# Patient Record
Sex: FEMALE | Race: WHITE
[De-identification: names, ages, dates, MRNs, and addresses within clinical notes are randomized per-mention and may not be internally consistent; named-entity substitution may affect disease eponyms.]

---

## 2021-06-09 ENCOUNTER — HOSPITAL ENCOUNTER (OUTPATIENT)
Dept: HOSPITAL 47 - RADUSWWP | Age: 76
Discharge: HOME | End: 2021-06-09
Attending: INTERNAL MEDICINE
Payer: MEDICARE

## 2021-06-09 DIAGNOSIS — K76.89: ICD-10-CM

## 2021-06-09 DIAGNOSIS — D64.9: Primary | ICD-10-CM

## 2021-06-09 DIAGNOSIS — R16.1: ICD-10-CM

## 2021-06-09 LAB
BASOPHILS # BLD AUTO: 0 K/UL (ref 0–0.2)
BASOPHILS NFR BLD AUTO: 1 %
EOSINOPHIL # BLD AUTO: 0.1 K/UL (ref 0–0.7)
EOSINOPHIL NFR BLD AUTO: 5 %
ERYTHROCYTE [DISTWIDTH] IN BLOOD BY AUTOMATED COUNT: 3.9 M/UL (ref 3.8–5.4)
ERYTHROCYTE [DISTWIDTH] IN BLOOD: 17 % (ref 11.5–15.5)
HCT VFR BLD AUTO: 29.8 % (ref 34–46)
HGB BLD-MCNC: 8.8 GM/DL (ref 11.4–16)
LDH SPEC-CCNC: 368 U/L (ref 313–618)
LYMPHOCYTES # SPEC AUTO: 1 K/UL (ref 1–4.8)
LYMPHOCYTES NFR SPEC AUTO: 33 %
MCH RBC QN AUTO: 22.6 PG (ref 25–35)
MCHC RBC AUTO-ENTMCNC: 29.6 G/DL (ref 31–37)
MCV RBC AUTO: 76.4 FL (ref 80–100)
MONOCYTES # BLD AUTO: 0.3 K/UL (ref 0–1)
MONOCYTES NFR BLD AUTO: 9 %
NEUTROPHILS # BLD AUTO: 1.5 K/UL (ref 1.3–7.7)
NEUTROPHILS NFR BLD AUTO: 49 %
PLATELET # BLD AUTO: 240 K/UL (ref 150–450)
VIT B12 SERPL-MCNC: 161 PG/ML (ref 200–944)
WBC # BLD AUTO: 3.1 K/UL (ref 3.8–10.6)

## 2021-06-09 PROCEDURE — 85045 AUTOMATED RETICULOCYTE COUNT: CPT

## 2021-06-09 PROCEDURE — 76700 US EXAM ABDOM COMPLETE: CPT

## 2021-06-09 PROCEDURE — 85025 COMPLETE CBC W/AUTO DIFF WBC: CPT

## 2021-06-09 PROCEDURE — 82607 VITAMIN B-12: CPT

## 2021-06-09 PROCEDURE — 82746 ASSAY OF FOLIC ACID SERUM: CPT

## 2021-06-09 PROCEDURE — 84165 PROTEIN E-PHORESIS SERUM: CPT

## 2021-06-09 PROCEDURE — 83615 LACTATE (LD) (LDH) ENZYME: CPT

## 2021-06-09 NOTE — US
EXAMINATION TYPE: US abdomen complete

 

DATE OF EXAM: 6/9/2021

 

COMPARISON: NONE

 

CLINICAL HISTORY: D64.9 Anemia.

 

EXAM MEASUREMENTS:

 

Liver Length:  16.1 cm   

Gallbladder Wall:  0.2 cm   

CBD:  0.5 cm

Spleen:  14.0 cm   

Right Kidney:  10.9 x 4.9 x 5.2 cm 

Left Kidney:  11.3 x 5.8 x 4.8 cm   

 

Morbidly obese patient who was unable to follow direction. Technically difficult very limited study. 


 

Pancreas:  Obscured by bowel gas

Liver:  heterogeneous, limited views  

Gallbladder:  cholelithiasis

**Evidence for sonographic Wilks's sign:  no

CBD:  wnl 

Spleen:  enlarged   

Right Kidney:  cortical thinning, limited views   

Left Kidney:  not well visualized, ? hydronephrosis

Upper IVC:  wnl  

Abd Aorta:  limited views

 

 

 

Suboptimal study due to patient's large body habitus. Visualized portion of the proximal and mid aort
a shows no aneurysm. Visualized portion of pancreas likely heterogeneous without mass or ductal dilat
ation. IVC seen hepatic dome.

 

Heterogeneity and visualized portion of liver without obvious mass or ductal dilatation. Intraluminal
 mobile shadowing gallstones. No abnormal wall thickening or pericholecystic fluid. Cortical thinning
 right kidney without hydronephrosis. Mild splenomegaly of 14.0 cm long axis. Poorly visualized left 
kidney on images saved. Cannot exclude left-sided hydronephrosis.

 

IMPRESSION: Mild splenomegaly with heterogeneous hyperechoic appearance of liver could reflect diffus
e fatty infiltration and/or underlying hepatocellular disease. No surrounding ascites. Suboptimal freddie
dy.

## 2021-06-10 LAB
ALBUMIN SERPL ELPH-MCNC: 3.62 G/DL (ref 3.8–4.9)
GAMMA GLOB SERPL ELPH-MCNC: 1.31 G/DL (ref 0.7–1.5)

## 2021-08-24 ENCOUNTER — HOSPITAL ENCOUNTER (OUTPATIENT)
Dept: HOSPITAL 47 - ORWHC2ENDO | Age: 76
Discharge: HOME | End: 2021-08-24
Attending: SURGERY
Payer: MEDICARE

## 2021-08-24 VITALS — DIASTOLIC BLOOD PRESSURE: 77 MMHG | SYSTOLIC BLOOD PRESSURE: 141 MMHG | HEART RATE: 75 BPM

## 2021-08-24 VITALS — BODY MASS INDEX: 43.2 KG/M2

## 2021-08-24 VITALS — TEMPERATURE: 97.9 F | RESPIRATION RATE: 16 BRPM

## 2021-08-24 DIAGNOSIS — Z88.0: ICD-10-CM

## 2021-08-24 DIAGNOSIS — Z86.010: ICD-10-CM

## 2021-08-24 DIAGNOSIS — D12.2: ICD-10-CM

## 2021-08-24 DIAGNOSIS — G40.909: ICD-10-CM

## 2021-08-24 DIAGNOSIS — D12.3: ICD-10-CM

## 2021-08-24 DIAGNOSIS — Z12.11: Primary | ICD-10-CM

## 2021-08-24 DIAGNOSIS — E03.9: ICD-10-CM

## 2021-08-24 DIAGNOSIS — D50.9: ICD-10-CM

## 2021-08-24 DIAGNOSIS — D13.1: ICD-10-CM

## 2021-08-24 DIAGNOSIS — Z79.890: ICD-10-CM

## 2021-08-24 DIAGNOSIS — F82: ICD-10-CM

## 2021-08-24 DIAGNOSIS — D51.9: ICD-10-CM

## 2021-08-24 DIAGNOSIS — D12.4: ICD-10-CM

## 2021-08-24 DIAGNOSIS — Z79.899: ICD-10-CM

## 2021-08-24 DIAGNOSIS — K31.7: ICD-10-CM

## 2021-08-24 DIAGNOSIS — K31.9: ICD-10-CM

## 2021-08-24 PROCEDURE — 43239 EGD BIOPSY SINGLE/MULTIPLE: CPT

## 2021-08-24 PROCEDURE — 45385 COLONOSCOPY W/LESION REMOVAL: CPT

## 2021-08-24 PROCEDURE — 88305 TISSUE EXAM BY PATHOLOGIST: CPT

## 2021-08-24 NOTE — P.PCN
Date of Procedure: 08/24/21


Procedure(s) Performed: 


PREOPERATIVE DIAGNOSIS: Anemia, screening


POSTOPERATIVE DIAGNOSIS: Gastric mass, gastric polyp, gastric nodules, multiple 

colon polyps, diverticulosis


PROCEDURE: 1.  EGD with biopsy 2.  Colonoscopy with snare polypectomy


ANESTHESIA: MAC


SURGEON: Tomas Valiente M.D.


SPECIMENS: Gastric mass, gastric polyp, gastric nodule, colon polyps


ENDOSCOPIC PROCEDURE: The patient was on the endoscopy table in the left 

decubitus position.  The Olympus gastroscope was inserted into the oropharynx 

and passed under direct visualization to the region of the third portion of the 

duodenum.  From that point the scope was slowly withdrawn inspecting all 

surfaces carefully.  There were no neoplastic inflammatory or polypoid lesions 

throughout the duodenum.  The pylorus was widely patent.  The stomach was 

carefully inspected.  There was a mass present along the lesser curvature that 

was friable in nature.  This was somewhat pedunculated in appearance.  This 

measured approximately 4-5 cm in diameter.  Multiple biopsies were taken of this

area.  This was present about 8-10 cm distal to the GE junction.  In the antrum 

there was some nodularity of the mucosa that was biopsied.  In the proximal 

antrum along the greater curvature a 1 cm polypoid lesion was seen and biopsied 

as well.  Retroflexion revealed normal hiatus.  The esophagus was examined and 

appeared normal.  


     The patient was kept on the endoscopy table in the left decubitus position.

 The Olympus colonoscope was inserted into the anus and passed under direct 

visualization to the base of the cecum.  The appendiceal orifice was visualized.

 From that point the scope was slowly withdrawn inspecting all surfaces 

carefully.  There were no neoplastic inflammatory or polypoid lesions throughout

the cecum.  In the ascending colon a polyp was seen and removed using the snare 

with cautery technique.  In the transverse colon another small polyp was seen 

and removed in a similar fashion.  In the descending colon another small polyp 

was seen and removed in a similar fashion.  The remainder of the descending 

sigmoid and rectum appeared normal.  The patient had scattered diverticulosis.  

Digital rectal examination was normal.  The patient was taken to the recovery 

room in stable condition per anesthesia guidelines.


RECOMMENDATIONS: Await biopsy results.  Source of anemia appears to be related 

to the gastric mass seen.  Follow-up in office 1-2 weeks. Total Volume (Ccs): 1.5 Kenalog Preparation: kenalog Concentration (Mg/Ml): 40.0 Administered By (Optional): Veronica Samaniego CMA Detail Level: None Consent: The risks of atrophy were reviewed with the patient. Add Option For Additional Mediation: No Concentration (Mg/Ml) Of Additional Medication: 2.5

## 2021-08-24 NOTE — P.GSHP
History of Present Illness


H&P Date: 08/24/21


Chief Complaint: Anemia, screening





75-year-old female here for screening colonoscopy and workup for iron deficiency

anemia.  Patient with personal history of colon polyps during colonoscopy 2012. 

No bowel complaints.  No rectal bleeding or melena.





Past Medical History


Past Medical History: Seizure Disorder


Additional Past Medical History / Comment(s): DEVELPMENTAL DELAY. , LIVES ALONE 

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 

GROCERIES (NEMESIO HAYNES # [phone]), PT STATES SEIZURES SINCE 8 YEARS OLD, 

STATES HER LAST SEIZURE WAS 1960., STATES OCCASIONAL SWELLING IN ANKLES ., 

ANEMIA & LOW B12 (DR CORINNA SEXTON)., STATES MOLES REMOVED FROM HER BACK TODAY 

(8/20/21).,  USING WALKER.


History of Any Multi-Drug Resistant Organisms: None Reported


Past Surgical History: Tonsillectomy


Additional Past Surgical History / Comment(s): CYST ON TAILBONE, CYST ON BREAST 

REMOVED.


Past Anesthesia/Blood Transfusion Reactions: No Reported Reaction


Additional Past Anesthesia/Blood Transfusion Reaction / Comment(s): STATES SHE 

GETS "TOO NERVOUS"


Additional Psychological History / Comment(s): DEVELOPMENTAL DELAY


Smoking Status: Never smoker


Past Alcohol Use History: None Reported


Past Drug Use History: None Reported





- Past Family History


  ** Mother


Family Medical History: Unable to Obtain





Medications and Allergies


                                Home Medications











 Medication  Instructions  Recorded  Confirmed  Type


 


Bumetanide 2 mg PO BID 08/20/21 08/20/21 History


 


Cholecalciferol [Vitamin D3 (25 50 mcg PO DAILY 08/20/21 08/20/21 History





Mcg = 1000 Iu)]    


 


Levothyroxine Sodium 125 mcg PO DAILY 08/20/21 08/20/21 History


 


Multivitamin [Multivitamins Adult 1 each PO DAILY 08/20/21 08/20/21 History





Gummies]    


 


Phenytoin Sodium Extended 200 mg PO BID 08/20/21 08/20/21 History





[Dilantin]    


 


Primidone 250 mg PO HS 08/20/21 08/20/21 History


 


Potassium Chloride 10 meq PO BID 08/23/21 08/23/21 History








                                    Allergies











Allergy/AdvReac Type Severity Reaction Status Date / Time


 


Penicillins Allergy  Rash/Hives Verified 08/24/21 10:26














Surgical - Exam


                                   Vital Signs











Temp Pulse Resp BP Pulse Ox


 


 97.9 F   93   16   139/63   93 L


 


 08/24/21 10:30  08/24/21 10:30  08/24/21 10:30  08/24/21 10:30  08/24/21 10:30

















Physical exam:


General: Well-developed, well-nourished


HEENT: Normocephalic, sclerae nonicteric


Abdomen: Nontender, nondistended


Extremities: No edema


Neuro: Alert and oriented





Assessment and Plan


(1) Colon cancer screening


Narrative/Plan: 


Will proceed with upper and lower endoscopy.


Current Visit: Yes   Status: Acute   Code(s): Z12.11 - ENCOUNTER FOR SCREENING 

FOR MALIGNANT NEOPLASM OF COLON   SNOMED Code(s): 530794745

## 2022-10-06 ENCOUNTER — HOSPITAL ENCOUNTER (OUTPATIENT)
Dept: HOSPITAL 47 - RADUSWWP | Age: 77
Discharge: HOME | End: 2022-10-06
Attending: INTERNAL MEDICINE
Payer: MEDICARE

## 2022-10-06 DIAGNOSIS — R60.0: Primary | ICD-10-CM

## 2022-10-06 LAB
ALBUMIN SERPL-MCNC: 3.9 G/DL (ref 3.8–4.9)
ALBUMIN/GLOB SERPL: 1.24 G/DL (ref 1.6–3.17)
ALP SERPL-CCNC: 125 U/L (ref 41–126)
ALT SERPL-CCNC: 30 U/L (ref 8–44)
ANION GAP SERPL CALC-SCNC: 8.7 MMOL/L (ref 10–18)
AST SERPL-CCNC: 21 U/L (ref 13–35)
BASOPHILS # BLD AUTO: 0.03 X 10*3/UL (ref 0–0.1)
BASOPHILS NFR BLD AUTO: 0.7 %
BUN SERPL-SCNC: 17.8 MG/DL (ref 9–27)
BUN/CREAT SERPL: 26.85 RATIO (ref 12–20)
CALCIUM SPEC-MCNC: 10.6 MG/DL (ref 8.7–10.3)
CHLORIDE SERPL-SCNC: 106 MMOL/L (ref 96–109)
CHOLEST SERPL-MCNC: 147 MG/DL (ref 0–200)
CO2 SERPL-SCNC: 26.1 MMOL/L (ref 20–27.5)
EOSINOPHIL # BLD AUTO: 0.23 X 10*3/UL (ref 0.04–0.35)
EOSINOPHIL NFR BLD AUTO: 5.3 %
ERYTHROCYTE [DISTWIDTH] IN BLOOD BY AUTOMATED COUNT: 4.12 X 10*6/UL (ref 4.1–5.2)
ERYTHROCYTE [DISTWIDTH] IN BLOOD: 13.8 % (ref 11.5–14.5)
FERRITIN SERPL-MCNC: 57.5 NG/ML (ref 10–291)
GLOBULIN SER CALC-MCNC: 3.2 G/DL (ref 1.6–3.3)
GLUCOSE SERPL-MCNC: 126 MG/DL (ref 70–110)
HCT VFR BLD AUTO: 42.3 % (ref 37.2–46.3)
HDLC SERPL-MCNC: 65.9 MG/DL (ref 40–60)
HGB BLD-MCNC: 13.6 G/DL (ref 12–15)
IMM GRANULOCYTES BLD QL AUTO: 0 %
IRON SERPL-MCNC: 72 UG/DL (ref 50–170)
LDLC SERPL CALC-MCNC: 68.4 MG/DL (ref 0–131)
LYMPHOCYTES # SPEC AUTO: 1.44 X 10*3/UL (ref 0.9–5)
LYMPHOCYTES NFR SPEC AUTO: 33.3 %
MCH RBC QN AUTO: 33 PG (ref 27–32)
MCHC RBC AUTO-ENTMCNC: 32.2 G/DL (ref 32–37)
MCV RBC AUTO: 102.7 FL (ref 80–97)
MONOCYTES # BLD AUTO: 0.44 X 10*3/UL (ref 0.2–1)
MONOCYTES NFR BLD AUTO: 10.2 %
NEUTROPHILS # BLD AUTO: 2.18 X 10*3/UL (ref 1.8–7.7)
NEUTROPHILS NFR BLD AUTO: 50.5 %
NRBC BLD AUTO-RTO: 0 /100 WBCS (ref 0–0)
PHENYTOIN SERPL-MCNC: 8 UG/ML (ref 10–20)
PLATELET # BLD AUTO: 156 X 10*3/UL (ref 140–440)
POTASSIUM SERPL-SCNC: 4.9 MMOL/L (ref 3.5–5.5)
PROT SERPL-MCNC: 7.1 G/DL (ref 6.2–8.2)
SODIUM SERPL-SCNC: 140 MMOL/L (ref 135–145)
T4 FREE SERPL-MCNC: 0.96 NG/DL (ref 0.8–1.8)
TIBC SERPL-MCNC: 301 UG/DL (ref 228–460)
TRIGL SERPL-MCNC: 63.4 MG/DL (ref 0–149)
VIT B12 SERPL-MCNC: 296 PG/ML (ref 200–944)
VLDLC SERPL CALC-MCNC: 12.68 MG/DL (ref 5–40)
WBC # BLD AUTO: 4.32 X 10*3/UL (ref 4.5–10)

## 2022-10-06 PROCEDURE — 83550 IRON BINDING TEST: CPT

## 2022-10-06 PROCEDURE — 80185 ASSAY OF PHENYTOIN TOTAL: CPT

## 2022-10-06 PROCEDURE — 82746 ASSAY OF FOLIC ACID SERUM: CPT

## 2022-10-06 PROCEDURE — 83036 HEMOGLOBIN GLYCOSYLATED A1C: CPT

## 2022-10-06 PROCEDURE — 82306 VITAMIN D 25 HYDROXY: CPT

## 2022-10-06 PROCEDURE — 83540 ASSAY OF IRON: CPT

## 2022-10-06 PROCEDURE — 85025 COMPLETE CBC W/AUTO DIFF WBC: CPT

## 2022-10-06 PROCEDURE — 80053 COMPREHEN METABOLIC PANEL: CPT

## 2022-10-06 PROCEDURE — 80061 LIPID PANEL: CPT

## 2022-10-06 PROCEDURE — 82607 VITAMIN B-12: CPT

## 2022-10-06 PROCEDURE — 93922 UPR/L XTREMITY ART 2 LEVELS: CPT

## 2022-10-06 PROCEDURE — 84443 ASSAY THYROID STIM HORMONE: CPT

## 2022-10-06 PROCEDURE — 93306 TTE W/DOPPLER COMPLETE: CPT

## 2022-10-06 PROCEDURE — 84439 ASSAY OF FREE THYROXINE: CPT

## 2022-10-06 PROCEDURE — 82728 ASSAY OF FERRITIN: CPT

## 2022-10-07 NOTE — CA
Transthoracic Echo Report 

 Name: Ilana Schulte 

 MRN:    [id number] 

 Age:    77     Gender:     F 

 

 :    1945 

 Exam Date:     10/06/2022 11:41 

 Exam Location: [facility] 

 Ht (in):     65     Wt (lb):     240 

 Ordering Physician:        Justino Allen MD 

 Attending/Referring Phys: 

 Technician         Gill Tadeo RDCS 

 Procedure CPT: 

 Indications:       R60.0 localized swelling 

 

 Cardiac Hx: 

 Technical Quality:      Technically difficult study 

 Contrast 1:    Lumason                     Total Dose (mL):      4 

 Contrast 2:                                Total Dose (mL): 

 

 MEASUREMENTS  (Male / Female) Normal Values 

 2D ECHO 

 LV Diastolic Diameter PLAX        4.6 cm                4.2 - 5.9 / 3.9 - 5.3 cm 

 LV Systolic Diameter PLAX         3.1 cm                 

 IVS Diastolic Thickness           1.3 cm                0.6 - 1.0 / 0.6 - 0.9 cm 

 LVPW Diastolic Thickness          1.2 cm                0.6 - 1.0 / 0.6 - 0.9 cm 

 LV Relative Wall Thickness        0.5                    

 RV Internal Dim ED PLAX           3.7 cm                 

 

 M-MODE 

 Aortic Root Diameter MM           2.9 cm                 

 LA Systolic Diameter MM           3.6 cm                 

 LA Ao Ratio MM                    1.2                    

 AV Cusp Separation MM             1.7 cm                 

 

 DOPPLER 

 AV Peak Velocity                  76.2 cm/s              

 AV Peak Gradient                  2.3 mmHg               

 LVOT Peak Velocity                87.9 cm/s              

 LVOT Peak Gradient                3.1 mmHg               

 MV Area PHT                       3.9 cm???                

 Mitral E Point Velocity           69.4 cm/s              

 Mitral A Point Velocity           73.3 cm/s              

 Mitral E to A Ratio               0.9                    

 MV Deceleration Time              192.5 ms               

 TR Peak Velocity                  222.4 cm/s             

 TR Peak Gradient                  19.8 mmHg              

 Right Ventricular Systolic Press  24.8 mmHg              

 

 

 FINDINGS 

 Left Ventricle 

 Mildly increased left ventricular wall thickness. Normal left ventricular  

 systolic function with no obvious regional wall motion abnormalities. Left  

 ventricular ejection fraction is estimated at 50-55 %. 

 

 Right Ventricle 

 Mild right ventricular dilatation. Right ventricular systolic pressure within  

 normal limits. 

 

 Right Atrium 

 Normal right atrial size. 

 

 Left Atrium 

 Normal left atrial size. 

 

 Mitral Valve 

 Mitral valve not well visualized. No mitral stenosis. 

 

 Aortic Valve 

 No aortic valve stenosis or regurgitation. 

 

 Tricuspid Valve 

 Mild tricuspid regurgitation. 

 

 Pulmonic Valve 

 Trace pulmonic regurgitation. 

 

 Pericardium 

 No pericardial effusion. 

 

 Aorta 

 Normal size aortic root and proximal ascending aorta. 

 

 CONCLUSIONS 

 Technically difficult study for interpretation 

 Normal left ventricular dimension and systolic function 

 Previewed by:  

 Dr. Karri Hurley MD 

 (Electronically Signed) 

 Final Date:      2022 10:44

## 2022-10-10 NOTE — US
EXAMINATION TYPE: US arterial LE single level

 

DATE OF EXAM: 10/6/2022 11:29 AM

 

CLINICAL HISTORY: R60.0 LOCALIZED EDEMA. painful heels where patient has blistering, swelling

 

Doppler Waveforms: 

Right: Biphasic

Left: Biphasic

 

 

Ankle-Brachial Indices:

Right: 1.0

Left: 1.0

 

Toe Brachial Indices:

Right: 0.6

Left: could not obtain, big toe crosses over 2nd digit and has red wound that is painful for patient.
 

 

 

 

IMPRESSION:  Some limitations the exam. Normal ankle-brachial indices. 7 Hour(s) 9 Minute(s)

## 2023-08-06 ENCOUNTER — HOSPITAL ENCOUNTER (INPATIENT)
Dept: HOSPITAL 47 - EC | Age: 78
LOS: 9 days | Discharge: NURSING, REHAB OR LONG-TERM CARE | DRG: 641 | End: 2023-08-15
Attending: INTERNAL MEDICINE | Admitting: INTERNAL MEDICINE
Payer: MEDICARE

## 2023-08-06 VITALS — BODY MASS INDEX: 46.9 KG/M2

## 2023-08-06 DIAGNOSIS — Z71.3: ICD-10-CM

## 2023-08-06 DIAGNOSIS — E87.70: ICD-10-CM

## 2023-08-06 DIAGNOSIS — L30.9: ICD-10-CM

## 2023-08-06 DIAGNOSIS — E66.01: ICD-10-CM

## 2023-08-06 DIAGNOSIS — E87.5: ICD-10-CM

## 2023-08-06 DIAGNOSIS — E86.1: ICD-10-CM

## 2023-08-06 DIAGNOSIS — Z86.018: ICD-10-CM

## 2023-08-06 DIAGNOSIS — T36.8X5A: ICD-10-CM

## 2023-08-06 DIAGNOSIS — E87.1: Primary | ICD-10-CM

## 2023-08-06 DIAGNOSIS — E03.9: ICD-10-CM

## 2023-08-06 DIAGNOSIS — Z79.899: ICD-10-CM

## 2023-08-06 DIAGNOSIS — R59.0: ICD-10-CM

## 2023-08-06 DIAGNOSIS — G40.909: ICD-10-CM

## 2023-08-06 DIAGNOSIS — Z88.0: ICD-10-CM

## 2023-08-06 DIAGNOSIS — R62.50: ICD-10-CM

## 2023-08-06 DIAGNOSIS — Z79.890: ICD-10-CM

## 2023-08-06 LAB
ALBUMIN SERPL-MCNC: 2.7 G/DL (ref 3.5–5)
ALP SERPL-CCNC: 81 U/L (ref 38–126)
ALT SERPL-CCNC: 49 U/L (ref 4–34)
ANION GAP SERPL CALC-SCNC: 5 MMOL/L
APTT BLD: 25.1 SEC (ref 22–30)
AST SERPL-CCNC: 140 U/L (ref 14–36)
BASOPHILS # BLD AUTO: 0 K/UL (ref 0–0.2)
BASOPHILS NFR BLD AUTO: 0 %
BUN SERPL-SCNC: 19 MG/DL (ref 7–17)
CALCIUM SPEC-MCNC: 8.7 MG/DL (ref 8.4–10.2)
CHLORIDE SERPL-SCNC: 86 MMOL/L (ref 98–107)
CO2 SERPL-SCNC: 19 MMOL/L (ref 22–30)
EOSINOPHIL # BLD AUTO: 0.1 K/UL (ref 0–0.7)
EOSINOPHIL NFR BLD AUTO: 2 %
ERYTHROCYTE [DISTWIDTH] IN BLOOD BY AUTOMATED COUNT: 3.59 M/UL (ref 3.8–5.4)
ERYTHROCYTE [DISTWIDTH] IN BLOOD: 14.3 % (ref 11.5–15.5)
GLUCOSE BLD-MCNC: 147 MG/DL (ref 70–110)
GLUCOSE SERPL-MCNC: 156 MG/DL (ref 74–99)
HCT VFR BLD AUTO: 32.8 % (ref 34–46)
HGB BLD-MCNC: 11.7 GM/DL (ref 11.4–16)
INR PPP: 0.9 (ref ?–1.2)
KETONES UR QL STRIP.AUTO: (no result)
LYMPHOCYTES # SPEC AUTO: 0.5 K/UL (ref 1–4.8)
LYMPHOCYTES NFR SPEC AUTO: 6 %
MAGNESIUM SPEC-SCNC: 1.8 MG/DL (ref 1.6–2.3)
MCH RBC QN AUTO: 32.6 PG (ref 25–35)
MCHC RBC AUTO-ENTMCNC: 35.7 G/DL (ref 31–37)
MCV RBC AUTO: 91.3 FL (ref 80–100)
MONOCYTES # BLD AUTO: 0.6 K/UL (ref 0–1)
MONOCYTES NFR BLD AUTO: 8 %
NEUTROPHILS # BLD AUTO: 5.9 K/UL (ref 1.3–7.7)
NEUTROPHILS NFR BLD AUTO: 82 %
NT-PROBNP SERPL-MCNC: 48 PG/ML
PH UR: 5.5 [PH] (ref 5–8)
PLATELET # BLD AUTO: 169 K/UL (ref 150–450)
POTASSIUM SERPL-SCNC: 4.7 MMOL/L (ref 3.5–5.1)
PROT SERPL-MCNC: 5.9 G/DL (ref 6.3–8.2)
PT BLD: 9.6 SEC (ref 9–12)
RBC UR QL: <1 /HPF (ref 0–5)
SODIUM SERPL-SCNC: 110 MMOL/L (ref 137–145)
SP GR UR: 1.02 (ref 1–1.03)
SQUAMOUS UR QL AUTO: <1 /HPF (ref 0–4)
UROBILINOGEN UR QL STRIP: <2 MG/DL (ref ?–2)
WBC # BLD AUTO: 7.2 K/UL (ref 3.8–10.6)
WBC #/AREA URNS HPF: 1 /HPF (ref 0–5)

## 2023-08-06 PROCEDURE — 70450 CT HEAD/BRAIN W/O DYE: CPT

## 2023-08-06 PROCEDURE — 82533 TOTAL CORTISOL: CPT

## 2023-08-06 PROCEDURE — 94760 N-INVAS EAR/PLS OXIMETRY 1: CPT

## 2023-08-06 PROCEDURE — 93306 TTE W/DOPPLER COMPLETE: CPT

## 2023-08-06 PROCEDURE — 76937 US GUIDE VASCULAR ACCESS: CPT

## 2023-08-06 PROCEDURE — 36410 VNPNXR 3YR/> PHY/QHP DX/THER: CPT

## 2023-08-06 PROCEDURE — 80048 BASIC METABOLIC PNL TOTAL CA: CPT

## 2023-08-06 PROCEDURE — 74177 CT ABD & PELVIS W/CONTRAST: CPT

## 2023-08-06 PROCEDURE — 84100 ASSAY OF PHOSPHORUS: CPT

## 2023-08-06 PROCEDURE — 36415 COLL VENOUS BLD VENIPUNCTURE: CPT

## 2023-08-06 PROCEDURE — 84484 ASSAY OF TROPONIN QUANT: CPT

## 2023-08-06 PROCEDURE — 85025 COMPLETE CBC W/AUTO DIFF WBC: CPT

## 2023-08-06 PROCEDURE — 83880 ASSAY OF NATRIURETIC PEPTIDE: CPT

## 2023-08-06 PROCEDURE — 84443 ASSAY THYROID STIM HORMONE: CPT

## 2023-08-06 PROCEDURE — 85730 THROMBOPLASTIN TIME PARTIAL: CPT

## 2023-08-06 PROCEDURE — 99291 CRITICAL CARE FIRST HOUR: CPT

## 2023-08-06 PROCEDURE — 96361 HYDRATE IV INFUSION ADD-ON: CPT

## 2023-08-06 PROCEDURE — 96374 THER/PROPH/DIAG INJ IV PUSH: CPT

## 2023-08-06 PROCEDURE — 80186 ASSAY OF PHENYTOIN FREE: CPT

## 2023-08-06 PROCEDURE — 96375 TX/PRO/DX INJ NEW DRUG ADDON: CPT

## 2023-08-06 PROCEDURE — 83735 ASSAY OF MAGNESIUM: CPT

## 2023-08-06 PROCEDURE — 95816 EEG AWAKE AND DROWSY: CPT

## 2023-08-06 PROCEDURE — 80053 COMPREHEN METABOLIC PANEL: CPT

## 2023-08-06 PROCEDURE — 83935 ASSAY OF URINE OSMOLALITY: CPT

## 2023-08-06 PROCEDURE — 85610 PROTHROMBIN TIME: CPT

## 2023-08-06 PROCEDURE — 81001 URINALYSIS AUTO W/SCOPE: CPT

## 2023-08-06 PROCEDURE — 83930 ASSAY OF BLOOD OSMOLALITY: CPT

## 2023-08-06 PROCEDURE — 71045 X-RAY EXAM CHEST 1 VIEW: CPT

## 2023-08-06 PROCEDURE — 84300 ASSAY OF URINE SODIUM: CPT

## 2023-08-06 PROCEDURE — 93005 ELECTROCARDIOGRAM TRACING: CPT

## 2023-08-06 PROCEDURE — 84295 ASSAY OF SERUM SODIUM: CPT

## 2023-08-06 NOTE — ED
Syncope HPI





- General


Chief Complaint: Fall


Stated Complaint: fall


Time Seen by Provider: 08/06/23 16:26


Source: EMS, RN notes reviewed, old records reviewed, Caregiver


Mode of arrival: EMS


Limitations: no limitations





- History of Present Illness


Initial Comments: 





This is a 77-year-old female to the emergency department for evaluation weakness

weakness continuing to feel well significantly swollen and possible syncopal 

event.  Patient has persistent symptoms since syncopal event and she fell off 

the toilet.  Patient has weakness currently were unable to provide accurate 

history historian at baseline patient having difficulty telling complete story 

of why she is here in the ER patient's thought passed out will sitting on the 

toilet.


MD Complaint: loss of consciousness, almost passed out, collapsed


-: hour(s)


Prodromal Symptoms: none, chest pain


Witnessed: yes - by bystander


Injuries Sustained Associated with Event: None


Current Symptoms: back to baseline, lightheaded


Context: at rest


Treatments Prior to Arrival: none





- Related Data


                                Home Medications











 Medication  Instructions  Recorded  Confirmed


 


Levothyroxine Sodium 125 mcg PO DAILY 08/20/21 08/06/23


 


Phenytoin Sodium Extended 200 mg PO BID 08/20/21 08/06/23





[Dilantin]   


 


Primidone 250 mg PO HS 08/20/21 08/06/23


 


Potassium Chloride 10 meq PO DAILY 08/23/21 08/06/23


 


Betamethasone Dipropionate 1 applic TOPICAL BID 08/06/23 08/06/23





[Betamethasone Dipropionate 0.05%]   


 


Cinacalcet [Sensipar] 30 mg PO DAILY 08/06/23 08/06/23


 


Hydrocortisone Oint 1 applic TOPICAL BID 08/06/23 08/06/23





[Hydrocortisone 2.5% Oint]   


 


Montelukast [Singulair] 10 mg PO DAILY 08/06/23 08/06/23


 


predniSONE See Taper PO DAILY 08/06/23 08/07/23











                                    Allergies











Allergy/AdvReac Type Severity Reaction Status Date / Time


 


Penicillins Allergy  Rash/Hives Verified 08/06/23 20:18














Review of Systems


ROS Statement: 


Those systems with pertinent positive or pertinent negative responses have been 

documented in the HPI.





ROS Other: All systems not noted in ROS Statement are negative.





Past Medical History


History of Any Multi-Drug Resistant Organisms: None Reported


Smoking Status: Never smoker





General Exam


Limitations: altered mental status, physical limitation


General appearance: alert, in no apparent distress, anxious, lethargic, obtunded

, in distress


Head exam: Present: atraumatic, normocephalic, normal inspection


Eye exam: Present: normal appearance, PERRL, EOMI.  Absent: scleral icterus, 

conjunctival injection, periorbital swelling


ENT exam: Present: normal exam, mucous membranes moist


Neck exam: Present: normal inspection.  Absent: tenderness, meningismus, 

lymphadenopathy


Respiratory exam: Present: normal lung sounds bilaterally.  Absent: respiratory 

distress, wheezes, rales, rhonchi, stridor


Cardiovascular Exam: Present: regular rate, normal rhythm, normal heart sounds. 

 Absent: systolic murmur, diastolic murmur, rubs, gallop, clicks


GI/Abdominal exam: Present: soft, normal bowel sounds.  Absent: distended, 

tenderness, guarding, rebound, rigid


Extremities exam: Present: normal inspection, full ROM, normal capillary refill.

  Absent: tenderness, pedal edema, joint swelling, calf tenderness


Back exam: Present: normal inspection


Neurological exam: Present: alert, oriented X3, CN II-XII intact


Psychiatric exam: Present: normal affect, normal mood


Skin exam: Present: warm, dry, intact, normal color.  Absent: rash





Course


                                   Vital Signs











  08/06/23 08/06/23 08/06/23





  16:22 18:36 19:00


 


Temperature 97.2 F L  


 


Pulse Rate 77 73 69


 


Pulse Rate [   





Cardiac Monitor   





]   


 


Respiratory 20 24 20





Rate   


 


Blood Pressure 158/75 210/69 134/59


 


Blood Pressure   





[Left Arm   





Supine]   


 


O2 Sat by Pulse 96 100 97





Oximetry   














  08/06/23





  20:10


 


Temperature 96.7 F L


 


Pulse Rate 


 


Pulse Rate [ 78





Cardiac Monitor 





] 


 


Respiratory 15





Rate 


 


Blood Pressure 


 


Blood Pressure 166/73





[Left Arm 





Supine] 


 


O2 Sat by Pulse 100





Oximetry 














- Reevaluation(s)


Reevaluation #1: 





08/06/23 17:49


Medical record is reviewed


Reevaluation #2: 





08/06/23 20:03


Patient has seizure here in the ER, symptoms worsening





Seizure has resolved, no recurrent syncope





Reevaluation #3: 





08/06/23 20:03


Patient informed of results questions answered


Reevaluation #4: 





08/06/23 17:49


Was pt. sent in by a medical professional or institution (, PA, NP, urgent 

care, hospital, or nursing home...) When possible be specific


@  -no


Did you speak to anyone other than the patient for history (EMS, parent, family,

police, friend...)? What history was obtained from this source 


@  -no


Did you review nursing and triage notes (agree or disagree)?  Why? 


@  -agree


Are old charts reviewed (outside hosp., previous admission, EMS record, old EKG,

old radiological studies, urgent care reports/EKG's, nursing home records)? 

Report findings 


@  -yes


Differential Diagnosis (chest pain, altered mental status, abdominal pain women,

abdominal pain men, vaginal bleeding, weakness, fever, dyspnea, syncope, 

headache, dizziness, GI bleed, back pain, seizure, CVA, palpatations, mental 

health, musculoskeletal)? 


@  -prior


EKG interpreted by me (3pts min.).


@  -yes


X-rays interpreted by me (1pt min.).


@  -no


CT interpreted by me (1pt min.).


@  -no


U/S interpreted by me (1pt. min.).


@  -no


What testing was considered but not performed or refused? (CT, X-rays, U/S, labs

)? Why?


@  -none


What meds were considered but not given or refused? Why?


@  -none


Did you discuss the management of the patient with other professionals 

(professionals i.e. , PA, NP, lab, RT, psych nurse, , , 

teacher, , )? Give summary


@  -no


Was smoking cessation discussed for >3mins.?


@  -no


Was critical care preformed (if so, how long)?


@  -yes55


Were there social determinants of health that impacted care today? How? 

(Homelessness, low income, unemployed, alcoholism, drug addiction, 

transportation, low edu. Level, literacy, decrease access to med. care, half-way, 

rehab)?


@  -none


Was there de-escalation of care discussed even if they declined (Discuss DNR or 

withdrawal of care, Hospice)? DNR status


@  -no


What co-morbidities impacted this encounter? (DM, HTN, Smoking, COPD, CAD, 

Cancer, CVA, ARF, Chemo, Hep., AIDS, mental health diagnosis, sleep apnea, 

morbid obesity)?


@  -none


Was patient admitted / discharged? Hospital course, mention meds given and 

route, prescriptions, significant lab abnormalities, going to OR and other 

pertinent info.


@  - 77 female to the emergency department for evaluation of syncopal event 

patient did have seizure here in the ER and then found to have subsequent sodium

of 110.  Patient will be admitted to the ICU on 3% saline


Admitted


Undiagnosed new problem with uncertain prognosis?


@  -no


Drug Therapy requiring intensive monitoring for toxicity (Heparin, Nitro, 

Insulin, Cardizem)?


@  -no


Were any procedures done?


@  -no


Diagnosis/symptom?


@  -Hyponatremic, seizures, syncope, weakness


Acute, or Chronic, or Acute on Chronic?


@  -Acute


Uncomplicated (without systemic symptoms) or Complicated (systemic symptoms)?


@  -Complicated


Side effects of treatment?


@  -no


Exacerbation, Progression, or Severe Exacerbation?


@  -exacerbation


Poses a threat to life or bodily function? How? (Chest pain, USA, MI, pneumonia,

PE, COPD, DKA, ARF, appy, cholecystitis, CVA, Diverticulitis, Homicidal, 

Suicidal, threat to staff... and all critical care pts)


@  -yes


Reevaluation #5: 





08/06/23 17:49


Differential Syncope:


Valvular disease, hypertrophic cardiomyopathy, pulmonary embolism, tamponade, 

tachycardia, bradycardia, MI, hypovolemia, hemorrhage, dissection, anemia, 

intracranial hemorrhage, seizure, hypoglycemia, carbon monoxide poisoning, this 

is not meant to be an all-inclusive list.





- Consultations


Consultation #1: 





Spoke with sound who agrees to admit this patient


Consultation #2: 





Spoke with ICU physician who agrees to admit the patient to the ICU





EKG Findings





- EKG Comments:


EKG Findings:: EKG is sinus 74  QRS 95 QTc 404





- EKG Results:


EKG: interpreted by ERMD





Medical Decision Making





- Medical Decision Making





77 female to the emergency department for evaluation of syncopal event patient 

did have seizure here in the ER and then found to have subsequent sodium of 110.

 Patient did recover from seizure activity with return to normal mental state, 

patient significantly ill here in the ER with persistent weakness.  Patient will

be admitted to the ICU on 3% saline





- Lab Data


Result diagrams: 


                                 08/09/23 03:32





                                 08/14/23 05:39


                                   Lab Results











  08/06/23 08/06/23 08/06/23 Range/Units





  17:20 17:20 17:20 


 


WBC  7.2    (3.8-10.6)  k/uL


 


RBC  3.59 L    (3.80-5.40)  m/uL


 


Hgb  11.7    (11.4-16.0)  gm/dL


 


Hct  32.8 L    (34.0-46.0)  %


 


MCV  91.3    (80.0-100.0)  fL


 


MCH  32.6    (25.0-35.0)  pg


 


MCHC  35.7    (31.0-37.0)  g/dL


 


RDW  14.3    (11.5-15.5)  %


 


Plt Count  169    (150-450)  k/uL


 


MPV  7.4    


 


Neutrophils %  82    %


 


Lymphocytes %  6    %


 


Monocytes %  8    %


 


Eosinophils %  2    %


 


Basophils %  0    %


 


Neutrophils #  5.9    (1.3-7.7)  k/uL


 


Lymphocytes #  0.5 L    (1.0-4.8)  k/uL


 


Monocytes #  0.6    (0-1.0)  k/uL


 


Eosinophils #  0.1    (0-0.7)  k/uL


 


Basophils #  0.0    (0-0.2)  k/uL


 


PT   9.6   (9.0-12.0)  sec


 


INR   0.9   (<1.2)  


 


APTT   25.1   (22.0-30.0)  sec


 


Sodium    110 L*  (137-145)  mmol/L


 


Potassium    4.7  (3.5-5.1)  mmol/L


 


Chloride    86 L  ()  mmol/L


 


Carbon Dioxide    19 L  (22-30)  mmol/L


 


Anion Gap    5  mmol/L


 


BUN    19 H  (7-17)  mg/dL


 


Creatinine    0.50 L  (0.52-1.04)  mg/dL


 


Est GFR (CKD-EPI)AfAm    >90  (>60 ml/min/1.73 sqM)  


 


Est GFR (CKD-EPI)NonAf    >90  (>60 ml/min/1.73 sqM)  


 


Glucose    156 H  (74-99)  mg/dL


 


Osmolality     (280-301)  mosm/kg


 


Calcium    8.7  (8.4-10.2)  mg/dL


 


Phosphorus    2.9  (2.5-4.5)  mg/dL


 


Magnesium    1.8  (1.6-2.3)  mg/dL


 


Total Bilirubin    0.5  (0.2-1.3)  mg/dL


 


AST    140 H  (14-36)  U/L


 


ALT    49 H  (4-34)  U/L


 


Alkaline Phosphatase    81  ()  U/L


 


Troponin I     (0.000-0.034)  ng/mL


 


NT-Pro-B Natriuret Pep    48  pg/mL


 


Total Protein    5.9 L  (6.3-8.2)  g/dL


 


Albumin    2.7 L  (3.5-5.0)  g/dL


 


Urine Color     


 


Urine Appearance     (Clear)  


 


Urine pH     (5.0-8.0)  


 


Ur Specific Gravity     (1.001-1.035)  


 


Urine Protein     (Negative)  


 


Urine Glucose (UA)     (Negative)  


 


Urine Ketones     (Negative)  


 


Urine Blood     (Negative)  


 


Urine Nitrite     (Negative)  


 


Urine Bilirubin     (Negative)  


 


Urine Urobilinogen     (<2.0)  mg/dL


 


Ur Leukocyte Esterase     (Negative)  


 


Urine RBC     (0-5)  /hpf


 


Urine WBC     (0-5)  /hpf


 


Ur Squamous Epith Cells     (0-4)  /hpf


 


Urine Mucus     (None)  /hpf


 


Urine Osmolality     ()  mosm/kg














  08/06/23 08/06/23 08/06/23 Range/Units





  17:20 17:20 19:20 


 


WBC     (3.8-10.6)  k/uL


 


RBC     (3.80-5.40)  m/uL


 


Hgb     (11.4-16.0)  gm/dL


 


Hct     (34.0-46.0)  %


 


MCV     (80.0-100.0)  fL


 


MCH     (25.0-35.0)  pg


 


MCHC     (31.0-37.0)  g/dL


 


RDW     (11.5-15.5)  %


 


Plt Count     (150-450)  k/uL


 


MPV     


 


Neutrophils %     %


 


Lymphocytes %     %


 


Monocytes %     %


 


Eosinophils %     %


 


Basophils %     %


 


Neutrophils #     (1.3-7.7)  k/uL


 


Lymphocytes #     (1.0-4.8)  k/uL


 


Monocytes #     (0-1.0)  k/uL


 


Eosinophils #     (0-0.7)  k/uL


 


Basophils #     (0-0.2)  k/uL


 


PT     (9.0-12.0)  sec


 


INR     (<1.2)  


 


APTT     (22.0-30.0)  sec


 


Sodium     (137-145)  mmol/L


 


Potassium     (3.5-5.1)  mmol/L


 


Chloride     ()  mmol/L


 


Carbon Dioxide     (22-30)  mmol/L


 


Anion Gap     mmol/L


 


BUN     (7-17)  mg/dL


 


Creatinine     (0.52-1.04)  mg/dL


 


Est GFR (CKD-EPI)AfAm     (>60 ml/min/1.73 sqM)  


 


Est GFR (CKD-EPI)NonAf     (>60 ml/min/1.73 sqM)  


 


Glucose     (74-99)  mg/dL


 


Osmolality   247 L*   (280-301)  mosm/kg


 


Calcium     (8.4-10.2)  mg/dL


 


Phosphorus     (2.5-4.5)  mg/dL


 


Magnesium     (1.6-2.3)  mg/dL


 


Total Bilirubin     (0.2-1.3)  mg/dL


 


AST     (14-36)  U/L


 


ALT     (4-34)  U/L


 


Alkaline Phosphatase     ()  U/L


 


Troponin I  <0.012    (0.000-0.034)  ng/mL


 


NT-Pro-B Natriuret Pep     pg/mL


 


Total Protein     (6.3-8.2)  g/dL


 


Albumin     (3.5-5.0)  g/dL


 


Urine Color    Yellow  


 


Urine Appearance    Clear  (Clear)  


 


Urine pH    5.5  (5.0-8.0)  


 


Ur Specific Gravity    1.025  (1.001-1.035)  


 


Urine Protein    Trace H  (Negative)  


 


Urine Glucose (UA)    Negative  (Negative)  


 


Urine Ketones    1+ H  (Negative)  


 


Urine Blood    Small H  (Negative)  


 


Urine Nitrite    Negative  (Negative)  


 


Urine Bilirubin    Negative  (Negative)  


 


Urine Urobilinogen    <2.0  (<2.0)  mg/dL


 


Ur Leukocyte Esterase    Negative  (Negative)  


 


Urine RBC    <1  (0-5)  /hpf


 


Urine WBC    1  (0-5)  /hpf


 


Ur Squamous Epith Cells    <1  (0-4)  /hpf


 


Urine Mucus    Rare H  (None)  /hpf


 


Urine Osmolality     ()  mosm/kg














  08/06/23 Range/Units





  19:20 


 


WBC   (3.8-10.6)  k/uL


 


RBC   (3.80-5.40)  m/uL


 


Hgb   (11.4-16.0)  gm/dL


 


Hct   (34.0-46.0)  %


 


MCV   (80.0-100.0)  fL


 


MCH   (25.0-35.0)  pg


 


MCHC   (31.0-37.0)  g/dL


 


RDW   (11.5-15.5)  %


 


Plt Count   (150-450)  k/uL


 


MPV   


 


Neutrophils %   %


 


Lymphocytes %   %


 


Monocytes %   %


 


Eosinophils %   %


 


Basophils %   %


 


Neutrophils #   (1.3-7.7)  k/uL


 


Lymphocytes #   (1.0-4.8)  k/uL


 


Monocytes #   (0-1.0)  k/uL


 


Eosinophils #   (0-0.7)  k/uL


 


Basophils #   (0-0.2)  k/uL


 


PT   (9.0-12.0)  sec


 


INR   (<1.2)  


 


APTT   (22.0-30.0)  sec


 


Sodium   (137-145)  mmol/L


 


Potassium   (3.5-5.1)  mmol/L


 


Chloride   ()  mmol/L


 


Carbon Dioxide   (22-30)  mmol/L


 


Anion Gap   mmol/L


 


BUN   (7-17)  mg/dL


 


Creatinine   (0.52-1.04)  mg/dL


 


Est GFR (CKD-EPI)AfAm   (>60 ml/min/1.73 sqM)  


 


Est GFR (CKD-EPI)NonAf   (>60 ml/min/1.73 sqM)  


 


Glucose   (74-99)  mg/dL


 


Osmolality   (280-301)  mosm/kg


 


Calcium   (8.4-10.2)  mg/dL


 


Phosphorus   (2.5-4.5)  mg/dL


 


Magnesium   (1.6-2.3)  mg/dL


 


Total Bilirubin   (0.2-1.3)  mg/dL


 


AST   (14-36)  U/L


 


ALT   (4-34)  U/L


 


Alkaline Phosphatase   ()  U/L


 


Troponin I   (0.000-0.034)  ng/mL


 


NT-Pro-B Natriuret Pep   pg/mL


 


Total Protein   (6.3-8.2)  g/dL


 


Albumin   (3.5-5.0)  g/dL


 


Urine Color   


 


Urine Appearance   (Clear)  


 


Urine pH   (5.0-8.0)  


 


Ur Specific Gravity   (1.001-1.035)  


 


Urine Protein   (Negative)  


 


Urine Glucose (UA)   (Negative)  


 


Urine Ketones   (Negative)  


 


Urine Blood   (Negative)  


 


Urine Nitrite   (Negative)  


 


Urine Bilirubin   (Negative)  


 


Urine Urobilinogen   (<2.0)  mg/dL


 


Ur Leukocyte Esterase   (Negative)  


 


Urine RBC   (0-5)  /hpf


 


Urine WBC   (0-5)  /hpf


 


Ur Squamous Epith Cells   (0-4)  /hpf


 


Urine Mucus   (None)  /hpf


 


Urine Osmolality  722  ()  mosm/kg














- EKG Data


-: EKG Interpreted by Me





Critical Care Time


Critical Care Time: Yes


Total Critical Care Time: 65





Disposition


Clinical Impression: 


 Fall, Seizure, Hyponatremia, Syncope, Weakness





Disposition: ADMITTED AS IP TO THIS \[facility]\""


Condition: Critical


Is patient prescribed a controlled substance at d/c from ED?: No


Time of Disposition: 20:00

## 2023-08-07 LAB
ALBUMIN SERPL-MCNC: 2.5 G/DL (ref 3.5–5)
ALP SERPL-CCNC: 64 U/L (ref 38–126)
ALT SERPL-CCNC: 46 U/L (ref 4–34)
ANION GAP SERPL CALC-SCNC: 6 MMOL/L
AST SERPL-CCNC: 133 U/L (ref 14–36)
BASOPHILS # BLD AUTO: 0 K/UL (ref 0–0.2)
BASOPHILS NFR BLD AUTO: 0 %
BUN SERPL-SCNC: 13 MG/DL (ref 7–17)
CALCIUM SPEC-MCNC: 8.6 MG/DL (ref 8.4–10.2)
CHLORIDE SERPL-SCNC: 91 MMOL/L (ref 98–107)
CO2 SERPL-SCNC: 20 MMOL/L (ref 22–30)
EOSINOPHIL # BLD AUTO: 0.2 K/UL (ref 0–0.7)
EOSINOPHIL NFR BLD AUTO: 3 %
ERYTHROCYTE [DISTWIDTH] IN BLOOD BY AUTOMATED COUNT: 3.59 M/UL (ref 3.8–5.4)
ERYTHROCYTE [DISTWIDTH] IN BLOOD: 14.5 % (ref 11.5–15.5)
GLUCOSE SERPL-MCNC: 105 MG/DL (ref 74–99)
HCT VFR BLD AUTO: 33.5 % (ref 34–46)
HGB BLD-MCNC: 11.5 GM/DL (ref 11.4–16)
LYMPHOCYTES # SPEC AUTO: 0.5 K/UL (ref 1–4.8)
LYMPHOCYTES NFR SPEC AUTO: 10 %
MAGNESIUM SPEC-SCNC: 2 MG/DL (ref 1.6–2.3)
MCH RBC QN AUTO: 32 PG (ref 25–35)
MCHC RBC AUTO-ENTMCNC: 34.3 G/DL (ref 31–37)
MCV RBC AUTO: 93.3 FL (ref 80–100)
MONOCYTES # BLD AUTO: 0.7 K/UL (ref 0–1)
MONOCYTES NFR BLD AUTO: 13 %
NEUTROPHILS # BLD AUTO: 3.7 K/UL (ref 1.3–7.7)
NEUTROPHILS NFR BLD AUTO: 70 %
PLATELET # BLD AUTO: 154 K/UL (ref 150–450)
POTASSIUM SERPL-SCNC: 4.8 MMOL/L (ref 3.5–5.1)
PROT SERPL-MCNC: 5.4 G/DL (ref 6.3–8.2)
SODIUM SERPL-SCNC: 112 MMOL/L (ref 137–145)
SODIUM SERPL-SCNC: 117 MMOL/L (ref 137–145)
WBC # BLD AUTO: 5.2 K/UL (ref 3.8–10.6)

## 2023-08-07 RX ADMIN — DOXYCYCLINE SCH MG: 100 CAPSULE ORAL at 01:18

## 2023-08-07 RX ADMIN — IOPAMIDOL PRN ML: 612 INJECTION, SOLUTION INTRAVENOUS at 16:00

## 2023-08-07 RX ADMIN — HEPARIN SODIUM SCH UNIT: 5000 INJECTION, SOLUTION INTRAVENOUS; SUBCUTANEOUS at 16:00

## 2023-08-07 RX ADMIN — HYDROCORTISONE SCH APPLIC: 1 CREAM TOPICAL at 21:31

## 2023-08-07 RX ADMIN — LEVOTHYROXINE SODIUM SCH MCG: 0.12 TABLET ORAL at 09:47

## 2023-08-07 RX ADMIN — HEPARIN SODIUM SCH UNIT: 5000 INJECTION, SOLUTION INTRAVENOUS; SUBCUTANEOUS at 00:58

## 2023-08-07 RX ADMIN — LEVETIRACETAM SCH MG: 100 INJECTION, SOLUTION, CONCENTRATE INTRAVENOUS at 21:31

## 2023-08-07 RX ADMIN — DOXYCYCLINE SCH MG: 100 CAPSULE ORAL at 21:31

## 2023-08-07 RX ADMIN — HYDROCORTISONE SCH APPLIC: 1 CREAM TOPICAL at 09:47

## 2023-08-07 RX ADMIN — DOXYCYCLINE SCH MG: 100 CAPSULE ORAL at 09:47

## 2023-08-07 RX ADMIN — PANTOPRAZOLE SODIUM SCH MG: 40 INJECTION, POWDER, FOR SOLUTION INTRAVENOUS at 09:46

## 2023-08-07 RX ADMIN — HEPARIN SODIUM SCH UNIT: 5000 INJECTION, SOLUTION INTRAVENOUS; SUBCUTANEOUS at 09:46

## 2023-08-07 RX ADMIN — HEPARIN SODIUM SCH UNIT: 5000 INJECTION, SOLUTION INTRAVENOUS; SUBCUTANEOUS at 23:13

## 2023-08-07 RX ADMIN — FERRIC OXIDE RED PRN APPLIC: 8; 8 LOTION TOPICAL at 21:28

## 2023-08-07 RX ADMIN — LEVETIRACETAM SCH MG: 100 INJECTION, SOLUTION, CONCENTRATE INTRAVENOUS at 09:46

## 2023-08-07 RX ADMIN — IOPAMIDOL PRN ML: 612 INJECTION, SOLUTION INTRAVENOUS at 14:52

## 2023-08-07 RX ADMIN — FERRIC OXIDE RED PRN APPLIC: 8; 8 LOTION TOPICAL at 01:21

## 2023-08-07 NOTE — P.HPIM
History of Present Illness


H&P Date: 08/06/23


Chief Complaint: near syncope





77 year old female with seizure disorder. 





patient provide limited history at this time 





she is coming in after almost passing out while on the toilet, she felt dizzy 

and weak and fell off the toilet, denies any head injury or new focal neuro 

deficits, she is not on blood thinner, denies having any associated chest pain ,

trouble breathing or palpitations. denies any prior episodes of similar event, 

she does not believe that she lost consciousness, and her last seizure has been 

years ago, however recently she has stopped her dilantin under medical 

supervision. 





while in the ED, she was noticed to have a seizure episode. blood work showed 

very low sodium level. 





she also has a rash over her face, upper chest and upper back. bilateral upper 

extremities , then to lesser extent on upper abd. and does not extend further 

than that, mucus membranes are not involved. she claims its itchy and painful at

certain spots, she picks on her skin a lot. she reports that she had this lesion

for long time, but could not specify how long 





 


review of systems


Pertinent positives as noted in HPI. All other systems were reviewed and are 

negative








on exam


Constitutional:          No acute distress, awake cooperative, slow to answer 

questions


Eyes:      Anicteric sclerae, moist conjunctiva, 


         Pupils equal round reactive to light





ENMT:      NC/AT


         Oropharynx clear, no erythema, or exudates





Neck:      Supple,  no masses, or JVD


         No carotid bruits


         No thyromegaly





Lungs:      Clear to auscultation


         Clear to percussion


         Normal respiratory effort, no accessory muscle use 





Cardiovascular:      Heart regular in rate and rhythm, 


         No murmurs, gallops, or rubs


         +1 bilateral  peripheral edema of bilateral upper and lower extremities







Abdominal:       Soft


         Nontender, no guarding, rebound or rigidity


         Abdomen moving with respiration


         Normoactive bowel sounds


         No hepatomegaly, No splenomegaly


         No palpable mass 


         No abdominal wall hernia noted 





Skin:      rash over the face, neck upper chest and back, bilateral upper 

extremities and upper abd, with areas of excoriation of the skin and dry 

crustation, along with yellowish mucus  discharge around her eyes, and under her

neck . 





Extremities:      No digital cyanosis 


         No clubbing


         Pedal pulses intact and symmetrical


         Radial pulses intact and symmetrical 


         No calf tenderness 





Psychiatric:      Alert and oriented to person, place  


      


Neuro      Muscles Strength 3/5 in bilateral upper and lower extremities 


         Sensation to light touch grossly present throughout


         Cranial nerves II-XII grossly intact 


Lymphatics:       no palpable cervical or supraclavicular   lymph nodes  











Past Medical History


Past Medical History: Seizure Disorder, Thyroid Disorder


History of Any Multi-Drug Resistant Organisms: None Reported


Past Surgical History: No Surgical Hx Reported


Additional Past Surgical History / Comment(s): cataract surgery, non-cancerous 

stomach growth removal


Past Psychological History: No Psychological Hx Reported


Smoking Status: Never smoker





Medications and Allergies


                                Home Medications











 Medication  Instructions  Recorded  Confirmed  Type


 


Levothyroxine Sodium 125 mcg PO DAILY 08/20/21 08/06/23 History


 


Phenytoin Sodium Extended 200 mg PO BID 08/20/21 08/06/23 History





[Dilantin]    


 


Primidone 250 mg PO HS 08/20/21 08/06/23 History


 


Potassium Chloride 10 meq PO DAILY 08/23/21 08/06/23 History


 


Betamethasone Dipropionate 1 applic TOPICAL BID 08/06/23 08/06/23 History





[Betamethasone Dipropionate 0.05%]    


 


Cinacalcet [Sensipar] 30 mg PO DAILY 08/06/23 08/06/23 History


 


Hydrocortisone Oint 1 applic TOPICAL BID 08/06/23 08/06/23 History





[Hydrocortisone 2.5% Oint]    


 


Montelukast [Singulair] 10 mg PO DAILY 08/06/23 08/06/23 History


 


predniSONE 1 dose PO AS DIRECTED 08/06/23 08/06/23 History








                                    Allergies











Allergy/AdvReac Type Severity Reaction Status Date / Time


 


Penicillins Allergy  Rash/Hives Verified 08/06/23 20:18














Physical Exam


Vitals: 


                                   Vital Signs











  Temp Pulse Pulse Resp BP BP BP


 


 08/07/23 00:00   76   18  153/73  


 


 08/06/23 23:30   80   20  128/60  


 


 08/06/23 23:00   67   14  113/49  


 


 08/06/23 22:47  96.7 F L   78  15   166/73 


 


 08/06/23 22:30   70   15  147/61  


 


 08/06/23 22:15   79   18  112/46  


 


 08/06/23 22:00   64   15  127/59  


 


 08/06/23 21:45   79   19  117/75  


 


 08/06/23 21:30   67   16  135/70  


 


 08/06/23 21:15   80   18  142/63  


 


 08/06/23 21:00   71   15  140/61  


 


 08/06/23 20:45   83   22  166/79  


 


 08/06/23 20:30  97.4 F L   81  18    123/55


 


 08/06/23 20:10  96.7 F L   78  15   166/73 


 


 08/06/23 19:00   69   20  134/59  


 


 08/06/23 18:36   73   24  210/69  


 


 08/06/23 16:22  97.2 F L  77   20  158/75  














  Pulse Ox


 


 08/07/23 00:00  94 L


 


 08/06/23 23:30  97


 


 08/06/23 23:00  98


 


 08/06/23 22:47  100


 


 08/06/23 22:30  95


 


 08/06/23 22:15  97


 


 08/06/23 22:00  97


 


 08/06/23 21:45  98


 


 08/06/23 21:30  95


 


 08/06/23 21:15  98


 


 08/06/23 21:00  95


 


 08/06/23 20:45  98


 


 08/06/23 20:30  96


 


 08/06/23 20:10  100


 


 08/06/23 19:00  97


 


 08/06/23 18:36  100


 


 08/06/23 16:22  96








                                Intake and Output











 08/06/23 08/06/23 08/07/23





 14:59 22:59 06:59


 


Intake Total  60 60


 


Output Total  780 670


 


Balance  -720 -610


 


Intake:   


 


  Intake, IV Titration  60 60





  Amount   


 


    Sodium Chloride 3%(  60 60





    Hypertonic) 500 ml @ 30   





    mls/hr IV .[id number] ONE Rx   





    #:[id number]   


 


Output:   


 


  Urine  780 670


 


    Uretheral ([place])  200 


 


Other:   


 


  Voiding Method  Indwelling Catheter 


 


  Weight  132.2 kg 














Results


CBC & Chem 7: 


                                 08/06/23 17:20





                                 08/06/23 23:25


Labs: 


                  Abnormal Lab Results - Last 24 Hours (Table)











  08/06/23 08/06/23 08/06/23 Range/Units





  17:20 17:20 17:20 


 


RBC  3.59 L    (3.80-5.40)  m/uL


 


Hct  32.8 L    (34.0-46.0)  %


 


Lymphocytes #  0.5 L    (1.0-4.8)  k/uL


 


Sodium   110 L*   (137-145)  mmol/L


 


Chloride   86 L   ()  mmol/L


 


Carbon Dioxide   19 L   (22-30)  mmol/L


 


BUN   19 H   (7-17)  mg/dL


 


Creatinine   0.50 L   (0.52-1.04)  mg/dL


 


Glucose   156 H   (74-99)  mg/dL


 


POC Glucose (mg/dL)     ()  mg/dL


 


Osmolality    247 L*  (280-301)  mosm/kg


 


AST   140 H   (14-36)  U/L


 


ALT   49 H   (4-34)  U/L


 


Total Protein   5.9 L   (6.3-8.2)  g/dL


 


Albumin   2.7 L   (3.5-5.0)  g/dL


 


Urine Protein     (Negative)  


 


Urine Ketones     (Negative)  


 


Urine Blood     (Negative)  


 


Urine Mucus     (None)  /hpf














  08/06/23 08/06/23 08/06/23 Range/Units





  19:20 20:37 23:25 


 


RBC     (3.80-5.40)  m/uL


 


Hct     (34.0-46.0)  %


 


Lymphocytes #     (1.0-4.8)  k/uL


 


Sodium    112 L*  (137-145)  mmol/L


 


Chloride     ()  mmol/L


 


Carbon Dioxide     (22-30)  mmol/L


 


BUN     (7-17)  mg/dL


 


Creatinine     (0.52-1.04)  mg/dL


 


Glucose     (74-99)  mg/dL


 


POC Glucose (mg/dL)   147 H   ()  mg/dL


 


Osmolality     (280-301)  mosm/kg


 


AST     (14-36)  U/L


 


ALT     (4-34)  U/L


 


Total Protein     (6.3-8.2)  g/dL


 


Albumin     (3.5-5.0)  g/dL


 


Urine Protein  Trace H    (Negative)  


 


Urine Ketones  1+ H    (Negative)  


 


Urine Blood  Small H    (Negative)  


 


Urine Mucus  Rare H    (None)  /hpf














Assessment and Plan


Assessment: 





77 year old female in here for near syncope, and witnessed seizure in the ED, I 

discussed the case with ED doc and I accepted the admission for symptomatic 

severe hyponatremia to the ICU with anticipated length of stay > 2 midnights 








symptomatic severe hyponatremia with fluid overload


breakthrough seizure episode , secondary to above vs history of seizure disorder

 off dilantin 





seizure precautions


correct hyponatremia , currently on hypertonic saline 3% , Goal is correction of

 sodium closer to 120 , currently she is at 110-->112 (GOal correction of 6-9 

meq per 24 hours)


check Na level q4hrs


hypoosmolar 


nephrology consult 


ICU care


initiated on keppra 1000 mg BID 


neurology consult 


monitor vital signs 


monitor urine output 


BUN 19 cr 0.5 unremarkable 





diffuse skin rash and lesions


rule out impetigo


started on doxycycline 100 mg po bid 


calamine lotion for comfort 


no evidence of mucus membranes involvement 





hypothyroid 


resume levothyroxine 





echocardiogram from 5/2022 showed preserved LVEF





full code


DVT PPX heparin sc tid

## 2023-08-07 NOTE — P.CNPUL
History of Present Illness


Consult date: 08/07/23


Requesting physician: Jace Salvador


Reason for consult: other (ICU management)


Chief complaint: Weakness and fall


History of present illness: 





I am seeing this patient in new consultation today and 07/07/2023 for 

symptomatic hyponatremia.  Patient is a 77-year-old white female past medical 

history significant for seizure disorder, hypothyroidism, and morbid obesity.  

The patient is a questionable historian.  Apparently, the patient presented to 

the emergency room yesterday afternoon with severe generalized weakness and fall

while in the bathroom.  She denies hitting her head.  There is no obvious head 

trauma.  On arrival to the emergency room, the patient's sodium was found to be 

severely low at 110. She denies starting any new medications, malignancies. She 

did reportedly have a seizure while emergency room, which was not described and 

I'm uncertain of timeframe.  It does appear that it was terminated with 2 mg of 

Ativan.  The patient was loaded with Keppra.  This is likely due to the 

patient's hyponatremia, however, the patient's Dilantin was reportedly stopped 

7-10 days ago.  The patient did develop a rash on her chest and bilateral arms. 

This may have developed between 1-2 weeks ago; as stated before, the patient is 

a poor historian. No reported recent seizure in over 10 years.  Phenytoin is 

known to cause Jackson-Marlon syndrome, however, this is felt to be less likely

the patient has chronically been on this medication.  The rash is localized to 

her chest, upper back, and bilateral arms.  It spares mucosal membranes.  Rash 

is erythemic with yellow crust.  Patient describes the rash as pruritic and 

painful. No fever. Patient has been started on doxycycline.  CBC on arrival is 

unremarkable.  No leukocytosis. BMP on arrival showed a sodium of 110, potassium

4.7, chloride 86, serum bicarb 19, BUN 19, creatinine 0.5, glucose 156.  Serum 

osmolality was low at 247.  Urine osmolality was 722.  Most recent sodium was 

112.  TSH and cortisol levels were within range.  Hypertonic 3% saline is 

infusing at 30 MLS per hour per nephrology.  Patient is edematous.  There is a 

Trujillo catheter with adequate urine production. No further seizure activity noted

by nursing staff.  Patient is currently sitting up in bed, on room air, in no 

acute distress.  She is drowsy but oriented.  She is unable to provide specific 

dates or times.  She has nonfocal generalized weakness throughout. Vital signs 

are stable.





Review of Systems





REVIEW OF SYSTEMS:


CONSTITUTIONAL: Denies any recent significant weight loss or weight gain.


EYES: Denies change in vision.


EARS, NOSE, MOUTH, THROAT: Denies headaches, denies sore throat.


CARDIOVASCULAR: Denies chest pain, palpitations or syncopal episodes.  Admits 

generalized swelling.


RESPIRATORY: Denies shortness of breath, cough, congestion or hemoptysis. 


GASTROINTESTINAL: Denies change in appetite, abdominal pain, nausea and 

vomiting, or diarrhea 


GENITOURINARY: Denies hematuria, denies infections.


MUSKULOSKELETAL: Denies pain, denies swelling.  


INTEGUMENTARY: Admits rashes described in HPI.


NEUROLOGICAL: Admits generalized weakness


PSYCHIATRIC: Denies anxiety, denies depression.


HEMATOLOGIC/LYMPHATIC: Denies anemia, denies enlarged lymph node





Past Medical History


Past Medical History: Seizure Disorder, Thyroid Disorder


History of Any Multi-Drug Resistant Organisms: None Reported


Past Surgical History: No Surgical Hx Reported


Additional Past Surgical History / Comment(s): cataract surgery, non-cancerous 

stomach growth removal


Past Psychological History: No Psychological Hx Reported


Smoking Status: Never smoker





Medications and Allergies


                                Home Medications











 Medication  Instructions  Recorded  Confirmed  Type


 


Levothyroxine Sodium 125 mcg PO DAILY 08/20/21 08/06/23 History


 


Phenytoin Sodium Extended 200 mg PO BID 08/20/21 08/06/23 History





[Dilantin]    


 


Primidone 250 mg PO HS 08/20/21 08/06/23 History


 


Potassium Chloride 10 meq PO DAILY 08/23/21 08/06/23 History


 


Betamethasone Dipropionate 1 applic TOPICAL BID 08/06/23 08/06/23 History





[Betamethasone Dipropionate 0.05%]    


 


Cinacalcet [Sensipar] 30 mg PO DAILY 08/06/23 08/06/23 History


 


Hydrocortisone Oint 1 applic TOPICAL BID 08/06/23 08/06/23 History





[Hydrocortisone 2.5% Oint]    


 


Montelukast [Singulair] 10 mg PO DAILY 08/06/23 08/06/23 History


 


predniSONE 1 dose PO AS DIRECTED 08/06/23 08/06/23 History








                                    Allergies











Allergy/AdvReac Type Severity Reaction Status Date / Time


 


Penicillins Allergy  Rash/Hives Verified 08/06/23 20:18














Physical Exam


Vitals: 


                                   Vital Signs











  Temp Pulse Pulse Resp BP BP BP


 


 08/07/23 00:00  97.6 F  76   18  153/73  


 


 08/06/23 23:30   80   20  128/60  


 


 08/06/23 23:00   67   14  113/49  


 


 08/06/23 22:47  96.7 F L   78  15   166/73 


 


 08/06/23 22:30   70   15  147/61  


 


 08/06/23 22:15   79   18  112/46  


 


 08/06/23 22:00   64   15  127/59  


 


 08/06/23 21:45   79   19  117/75  


 


 08/06/23 21:30   67   16  135/70  


 


 08/06/23 21:15   80   18  142/63  


 


 08/06/23 21:00   71   15  140/61  


 


 08/06/23 20:45   83   22  166/79  


 


 08/06/23 20:30  97.4 F L   81  18    123/55


 


 08/06/23 20:10  96.7 F L   78  15   166/73 


 


 08/06/23 19:00   69   20  134/59  


 


 08/06/23 18:36   73   24  210/69  


 


 08/06/23 16:22  97.2 F L  77   20  158/75  














  Pulse Ox


 


 08/07/23 00:00  94 L


 


 08/06/23 23:30  97


 


 08/06/23 23:00  98


 


 08/06/23 22:47  100


 


 08/06/23 22:30  95


 


 08/06/23 22:15  97


 


 08/06/23 22:00  97


 


 08/06/23 21:45  98


 


 08/06/23 21:30  95


 


 08/06/23 21:15  98


 


 08/06/23 21:00  95


 


 08/06/23 20:45  98


 


 08/06/23 20:30  96


 


 08/06/23 20:10  100


 


 08/06/23 19:00  97


 


 08/06/23 18:36  100


 


 08/06/23 16:22  96








                                Intake and Output











 08/06/23 08/06/23 08/07/23





 14:59 22:59 06:59


 


Intake Total  60 60


 


Output Total  780 670


 


Balance  -720 -611


 


Intake:   


 


  Intake, IV Titration  60 60





  Amount   


 


    Sodium Chloride 3%(  60 60





    Hypertonic) 500 ml @ 30   





    mls/hr IV .[id number] ONE Rx   





    #:[id number]   


 


Output:   


 


  Urine  780 670


 


    Uretheral ([place])  200 


 


Other:   


 


  Voiding Method  Indwelling Catheter 


 


  Weight  132.2 kg 














GENERAL EXAM: Drowsy but for a fully oriented, 77-year-old white female, who is 

morbidly obese , comfortable in no apparent distress.


HEAD: Normocephalic and atraumatic


EYES: Normal reaction of pupils, equal size.


NOSE: Clear with pink turbinates.


THROAT: No erythema or exudates.


NECK: No masses, no JVD.


CHEST: No chest wall deformity.


LUNGS: Equal air entry with no crackles, wheeze, rhonchi or dullness.  On room 

air.  No conversational dyspnea or accessory muscle use.. 


CVS: S1 and S2 normal with no audible murmur, regular rhythm. No extra heart 

sounds


ABDOMEN: Obese abdomen, no hepatosplenomegaly, active bowel sounds, no guarding 

or rigidity. 


SPINE: No scoliosis or deformity


SKIN: Rash localized to the trunk and bilateral upper extremities.  It is 

erythemic with scaling and crusts. 


CENTRAL NERVOUS SYSTEM: No focal deficits, tone is weak in all 4 extremities.  

Strength is 3/5 bilaterally in all four extremities.  No seizure activity noted.


EXTREMITIES: Generalized edema with pitting edema bilateral lower extremities 

3+.  No clubbing, or cyanosis.  Peripheral pulses are intact.





Results





- Laboratory Findings


CBC and BMP: 


                                 08/06/23 17:20





                                 08/06/23 23:25


PT/INR, D-dimer











PT  9.6 sec (9.0-12.0)   08/06/23  17:20    


 


INR  0.9  (<1.2)   08/06/23  17:20    








Abnormal lab findings: 


                                  Abnormal Labs











  08/06/23 08/06/23 08/06/23





  17:20 17:20 17:20


 


RBC  3.59 L  


 


Hct  32.8 L  


 


Lymphocytes #  0.5 L  


 


Sodium   110 L* 


 


Chloride   86 L 


 


Carbon Dioxide   19 L 


 


BUN   19 H 


 


Creatinine   0.50 L 


 


Glucose   156 H 


 


POC Glucose (mg/dL)   


 


Osmolality    247 L*


 


AST   140 H 


 


ALT   49 H 


 


Total Protein   5.9 L 


 


Albumin   2.7 L 


 


Urine Protein   


 


Urine Ketones   


 


Urine Blood   


 


Urine Mucus   














  08/06/23 08/06/23 08/06/23





  19:20 20:37 23:25


 


RBC   


 


Hct   


 


Lymphocytes #   


 


Sodium    112 L*


 


Chloride   


 


Carbon Dioxide   


 


BUN   


 


Creatinine   


 


Glucose   


 


POC Glucose (mg/dL)   147 H 


 


Osmolality   


 


AST   


 


ALT   


 


Total Protein   


 


Albumin   


 


Urine Protein  Trace H  


 


Urine Ketones  1+ H  


 


Urine Blood  Small H  


 


Urine Mucus  Rare H  














Assessment and Plan


Assessment: 





Symptomatic severe hypotonic hyponatremia, likely related to a component of 

SIADH. Serum osmolality was low at 247.  Urine osmolality was 722.  Most recent 

sodium was 112.  TSH and cortisol levels were within range.  Hypertonic 3% 

saline is infusing at 30 MLS per hour per nephrology.





Suspected seizure, with history of seizure disorder. Normally maintained on 

Dilantin, however, this has been reportedly discontinued.  Patient was given 2 

mg of Ativan in the emergency room and loaded with Keppra.





Fall, likely precipitated by above





Dermatitis, unknown etiology





Mildly elevated LFTs, undetermined significance





Hypothyroidism





Morbid obesity with a BMI of 49 kg/m




















Plan:


Patient's medications and labs reviewed


Continue 3% hypertonic saline per nephrology


Sodium recheck every 4 hours


Obtain chest x-ray


Patient has been loaded with Keppra, no further seizure activity reported


Add seizure precautions


Neurology has been added to the case


Patient has been started on doxycycline for suspected secondary infection of the

 patient's pruritic rash. 


Hydrocortisone cream twice a day


Heparin for DVT prophylaxis


Protonix for GI prophylaxis


We will continue to follow and make recommendations while in the intensive care 

unit











I have personally seen and examined the patient, performed the documentation and

 the assessment and plan as written.  Number of minutes spent on the visit:20





Time with Patient: Greater than 30

## 2023-08-07 NOTE — CT
EXAMINATION TYPE: CT brain wo con

CT DLP: 1291.4 mGycm, Automated exposure control for dose reduction was used.

 

DATE OF EXAM: 8/7/2023 5:06 PM

 

COMPARISON: None.

 

CLINICAL INDICATION:Female, 77 years old with history of seizure, ams, seizure

 

TECHNIQUE: 

Brain: Axial CT images of the brain were obtained with coronal and sagittal reformats created and rev
iewed.

Contrast used: None.

Oral contrast used: None.

 

FINDINGS:

 

Brain:

Extra-axial spaces: No abnormal extra-axial fluid collections.

Ventricular system: Dilatation in proportion to cerebral atrophy.

Cerebral parenchyma: Cerebral atrophy. No acute intraparenchymal hemorrhage or mass effect.  The gray
-white junction is well differentiated. Scattered hypoattenuating areas are seen within the white mat
ter.  

Cerebellum: Unremarkable.

Mass effect: No evidence of midline shift.

Intracranial vasculature: Atherosclerotic calcifications of the intracranial vessels.

Soft tissues: Normal.

Calvarium/osseous structures: No depressed skull fracture.

Paranasal sinuses and mastoid air cells: Mild scattered paranasal sinus disease.

Visualized orbits: Bilateral aphakia

 

 

IMPRESSION:

1. No acute intracranial process.

 

2. Nonspecific white matter changes, likely secondary to chronic small vessel ischemic disease.

## 2023-08-07 NOTE — XR
EXAMINATION TYPE: XR chest 1V portable

 

DATE OF EXAM: 8/7/2023 5:47 AM

 

COMPARISON: None

 

TECHNIQUE: XR chest 1V portable Frontal view of the chest.

 

CLINICAL INDICATION:Female, 77 years old with history of rule out pulmonary etiology of SIADH; 

 

FINDINGS: 

Lungs/Pleura: There is no evidence of pleural effusion, focal consolidation, or pneumothorax.  

Pulmonary vascularity: Unremarkable.

Heart/mediastinum: Cardiomediastinal silhouette is unremarkable.

Musculoskeletal: No acute osseous pathology.

 

IMPRESSION: 

Low lung volumes with a generalized hazy appearance which could represent atelectasis versus pulmonar
y edema correlate with serum BNP.

## 2023-08-07 NOTE — CT
EXAMINATION TYPE: CT abdomen pelvis w con

CT DLP: 3413.4 mGycm, Automated exposure control for dose reduction was used.

 

DATE OF EXAM: 8/7/2023 5:07 PM

 

COMPARISON: None. 

 

CLINICAL INDICATION:Female, 77 years old with history of know lymphadenopathy, Hx gastric adenoma; ab
dominal pain

 

TECHNIQUE:  Axial CT of the abdomen and pelvis. Sagittal and coronal reformats were created on a [employer] workstation. 

 

Contrast used:100 mL of Isovue 300 with IV Contrast, (none if empty)

Oral contrast used: with Oral Contrast (none if empty)

 

FINDINGS: 

LOWER CHEST: The heart is prominent in size. 

 

ABDOMEN

LIVER: Unremarkable

GALLBLADDER AND BILE DUCTS: Unremarkable.

PANCREAS: Unremarkable.

SPLEEN: Unremarkable.

ADRENAL GLANDS: Unremarkable.

KIDNEYS AND URETERS: No evidence of hydronephrosis or renal calculus. The ureters are unremarkable.  


 

PELVIS

BLADDER: Nondistended with Trujillo catheter in place.

REPRODUCTIVE: Unremarkable.

 

ABDOMEN & PELVIS

STOMACH AND BOWEL: No evidence of bowel obstruction. The appendix is normal.

PERITONEUM/RETROPERITONEUM: No evidence of pneumoperitoneum or free fluid.

VASCULATURE: No evidence of aortic aneurysm. 

MUSCULOSKELETAL: No acute osseous abnormalities. Mild disc degeneration changes are present throughou
t the thoracolumbar spine.

LYMPH NODES: No gross evidence for lymphadenopathy. Retroperitoneal lymph nodes measuring up to 17 mm
 just before the aortic bifurcation.

SOFT TISSUE/ABDOMINAL WALL: Fat-containing umbilical hernia.

 

IMPRESSION:

1.  No evidence for acute abdominal process. No obstructive uropathy or evidence for colitis/divertic
ulitis. The appendix is normal.

2.  Catheter placement air within the lumen of the bladder correlate with urinalysis for cystitis.

3.  Fat-containing buccal hernia.

4.  Retroperitoneal lymphadenopathy the largest just next aorta near the bifurcation measuring up to 
17 mm clinical correlation advised.

## 2023-08-07 NOTE — P.CNNES
History of Present Illness


Consult date: 08/07/23


Requesting physician: Jace Salvador


Reason for Consult: Seizure


History of Present Illness: 





Patient is a 77-year-old female with long-standing history of seizure disorder, 

slight developmental delays, lives alone by herself, came to the hospital by 

ambulance yesterday at 4:20 PM after she suffered from a fall while sitting on 

the toilet.  Apparently patient felt dizzy, felt of the toilet, but did not hit 

her head.  The neighbor living downstairs heard a fall, and went in to check on 

her, and was found on the floor.  He called the ambulance and patient was 

brought to the hospital.  EMS flow sheet not available in the chart.





Vital signs on arrival blood pressure 158/75, pulse rate 77, temperature 97.2.  

Blood test shows normal WBC, hemoglobin 11.7, PT/PTT normal.  Sodium was 110, 

potassium 4.7, renal functions normal, osmolality 247 (280-301), AST elevated 

140, ALT 49.  Troponin negative, TSH normal, cortisone normal, UA negative.  

Repeat sodium 112 and then 117.  EKG shows sinus rhythm.





Patient apparently had a seizure witnessed in the ER.  Patient was noted to have

rash all over the body.  Patient apparently has seen a dermatologist a week ago,

who felt it was a rash from Dilantin.  Dilantin was discontinued.  She was not 

taking any seizure medication for last 7 days.  Patient tells me that she was 

born with seizure disorder.  He is to be on phenobarbital, but apparently was 

not working, therefore at age 8 years, it was switched to Dilantin.  Patient 

states that she was following up with Dr. Jain.  Patient has not had any 

seizures since 1960.  Patient claims that she is to have grand mal seizures.  

Patient was on Dilantin 200 mg twice a day, which has been discontinued.  

Patient was started on Keppra in the ER after patient had the witnessed seizure.

 





Patient denies any history of diabetes, any cardiac stenting, bypass surgery, 

any history of cancer, CHF or liver disease.  Patient's home medications include

primidone 250 mg at bedtime, Dilantin 200 mg twice a day, levothyroxine 125 g, 

potassium, Singulair, Sensipar 30 mg, prednisone 10 mg as directed.  Patient 

admits to drinking a lot of water, but then she said she drinks "one bottle".  

Patient is not a good historian.  Patient denies any use of tobacco or alcohol.





Patient's Dilantin level was 4.6 on 08/01/2023.  It always stays between 6-8 in 

subtherapeutic range since 2016.





Review of Systems


Constitutional: Reports weight gain, Denies chills, Denies fever


Eyes: denies blurred vision, denies diplopia, denies pain


Ears: deny: decreased hearing, ear discharge


Ears, nose, mouth and throat: Denies headache, Denies nasal discharge, Denies 

sore throat, Denies vertigo


Cardiovascular: Denies chest pain, Denies shortness of breath


Respiratory: Denies cough, Denies excessive sputum


Gastrointestinal: Denies abdominal pain, Denies diarrhea, Denies nausea, Denies 

vomiting


Genitourinary: Denies dysuria, Denies hematuria


Musculoskeletal: Denies low back pain, Denies myalgias, Denies neck pain


Integumentary: Reports color changes, Reports dryness, Reports pruritus, Reports

rash


Neurological: Reports as per HPI


Psychiatric: Denies anxiety, Denies depression


Endocrine: Reports fatigue, Reports weight change


Hematologic/Lymphatic: Reports easy bruising, Denies easy bleeding





Past Medical History


Past Medical History: Seizure Disorder, Thyroid Disorder


History of Any Multi-Drug Resistant Organisms: None Reported


Past Surgical History: No Surgical Hx Reported


Additional Past Surgical History / Comment(s): cataract surgery, non-cancerous 

stomach growth removal


Past Psychological History: No Psychological Hx Reported


Smoking Status: Never smoker





Medications and Allergies


                                Home Medications











 Medication  Instructions  Recorded  Confirmed  Type


 


Levothyroxine Sodium 125 mcg PO DAILY 08/20/21 08/06/23 History


 


Phenytoin Sodium Extended 200 mg PO BID 08/20/21 08/06/23 History





[Dilantin]    


 


Primidone 250 mg PO HS 08/20/21 08/06/23 History


 


Potassium Chloride 10 meq PO DAILY 08/23/21 08/06/23 History


 


Betamethasone Dipropionate 1 applic TOPICAL BID 08/06/23 08/06/23 History





[Betamethasone Dipropionate 0.05%]    


 


Cinacalcet [Sensipar] 30 mg PO DAILY 08/06/23 08/06/23 History


 


Hydrocortisone Oint 1 applic TOPICAL BID 08/06/23 08/06/23 History





[Hydrocortisone 2.5% Oint]    


 


Montelukast [Singulair] 10 mg PO DAILY 08/06/23 08/06/23 History


 


predniSONE See Taper PO DAILY 08/06/23 08/07/23 History








                                    Allergies











Allergy/AdvReac Type Severity Reaction Status Date / Time


 


Penicillins Allergy  Rash/Hives Verified 08/06/23 20:18














Physical Examination





- Vital Signs


Vital Signs: 


                                   Vital Signs











  Temp Pulse Pulse Resp BP BP BP


 


 08/07/23 07:34       


 


 08/07/23 07:00   68   8 L  111/46  


 


 08/07/23 06:30   65   15  123/69  


 


 08/07/23 06:00   81   14  123/55  


 


 08/07/23 05:30   78   16  136/55  


 


 08/07/23 05:00   75   16  115/46  


 


 08/07/23 04:30   67   14  125/53  


 


 08/07/23 04:00  97.8 F  78   20  125/54  


 


 08/07/23 03:30   72   16  117/71  


 


 08/07/23 03:00   64   16  107/47  


 


 08/07/23 02:30   64   15  147/52  


 


 08/07/23 02:00   68   16  138/53  


 


 08/07/23 01:30   74   16  135/92  


 


 08/07/23 01:00   77   16  119/50  


 


 08/07/23 00:30   66   17  129/101  


 


 08/07/23 00:14   67   15  129/101  


 


 08/07/23 00:00  97.6 F  76   18  153/73  


 


 08/06/23 23:30   80   20  128/60  


 


 08/06/23 23:00   67   14  113/49  


 


 08/06/23 22:47  96.7 F L   78  15   166/73 


 


 08/06/23 22:30   70   15  147/61  


 


 08/06/23 22:15   79   18  112/46  


 


 08/06/23 22:00   64   15  127/59  


 


 08/06/23 21:45   79   19  117/75  


 


 08/06/23 21:30   67   16  135/70  


 


 08/06/23 21:15   80   18  142/63  


 


 08/06/23 21:00   71   15  140/61  


 


 08/06/23 20:45   83   22  166/79  


 


 08/06/23 20:30  97.4 F L   81  18    123/55


 


 08/06/23 20:10  96.7 F L   78  15   166/73 


 


 08/06/23 19:00   69   20  134/59  


 


 08/06/23 18:36   73   24  210/69  


 


 08/06/23 16:22  97.2 F L  77   20  158/75  














  Pulse Ox


 


 08/07/23 07:34  97


 


 08/07/23 07:00  94 L


 


 08/07/23 06:30  93 L


 


 08/07/23 06:00 


 


 08/07/23 05:30  95


 


 08/07/23 05:00  96


 


 08/07/23 04:30  90 L


 


 08/07/23 04:00  95


 


 08/07/23 03:30  97


 


 08/07/23 03:00  97


 


 08/07/23 02:30  93 L


 


 08/07/23 02:00  99


 


 08/07/23 01:30  95


 


 08/07/23 01:00  96


 


 08/07/23 00:30  93 L


 


 08/07/23 00:14  93 L


 


 08/07/23 00:00  94 L


 


 08/06/23 23:30  97


 


 08/06/23 23:00  98


 


 08/06/23 22:47  100


 


 08/06/23 22:30  95


 


 08/06/23 22:15  97


 


 08/06/23 22:00  97


 


 08/06/23 21:45  98


 


 08/06/23 21:30  95


 


 08/06/23 21:15  98


 


 08/06/23 21:00  95


 


 08/06/23 20:45  98


 


 08/06/23 20:30  96


 


 08/06/23 20:10  100


 


 08/06/23 19:00  97


 


 08/06/23 18:36  100


 


 08/06/23 16:22  96








                                Intake and Output











 08/06/23 08/07/23 08/07/23





 22:59 06:59 14:59


 


Intake Total 60 270 


 


Output Total 780 1560 


 


Balance -720 -1290 


 


Intake:   


 


  Intake, IV Titration 60 270 





  Amount   


 


    Sodium Chloride 3%( 60 270 





    Hypertonic) 500 ml @ 30   





    mls/hr IV .[id number] ONE Rx   





    #:[id number]   


 


Output:   


 


  Urine 780 1560 


 


    Uretheral ([place]) 200  


 


Other:   


 


  Voiding Method Indwelling Catheter Indwelling Catheter 


 


  Weight 132.2 kg 132.2 kg 














Patient is an elderly  female, who is laying in the bed, in no acute 

distress. 


Patient is alert awake.  Patient states is the month of August, but she could 

not tell the year.  She says that she lives in [facility].  She does

 not know the name of the current building she is in.   Speech and language 

functions are normal.  Patient can name and repeat very well.  No aphasia or 

dysarthria.  Attention, concentration is intact and fund of knowledge is 

slightly limited.  Detail cognitive function testing deferred.





On cranial nerve examination, pupils are equal, round and reacting to light, 

visual fields are full on confrontation, with no neglect on double simultaneous 

stimulation.  Extraocular muscles are intact with no nystagmus.  Face is 

symmetric, tongue protrudes to the midline.  Palatal elevation and sensation 

normal, hearing and shoulder shrug normal, facial sensation normal.  No evidence

 of oral trauma.





On muscle strength testing, there is no pronator drift.  Patient appears to be 

generalized swollen.  Her , biceps and triceps appears normal.  She has 

difficulty with lifting her arm up because of swelling.  Patient wiggles her 

feet equally.





Deep tendon reflexes are symmetric very hypoactive and plantars are flat.





Sensory to touch is equal with no neglect on double simultaneous stimulation.





Cerebellar function showed no ataxia for finger-to-nose testing.  Cannot check 

in the lower limbs because of swelling.  Tone and bulk of muscles normal.





Gait deferred..





On general examination, there is no carotid bruit or murmur, S1-S2 audible.  

Chest is clear on consultation.  Abdomen is soft nontender.  No organomegaly, 

bowel sounds present.   Patient has moderate to severe peripheral edema.  

Patient has generalized maculopapular rash over her torso, and also on her face.

  Also some in the extremities.





Results





- Laboratory Findings


CBC and BMP: 


                                 08/07/23 03:18





                                 08/07/23 07:46


Abnormal Lab Findings: 


                                  Abnormal Labs











  08/06/23 08/06/23 08/06/23





  17:20 17:20 17:20


 


RBC  3.59 L  


 


Hct  32.8 L  


 


Lymphocytes #  0.5 L  


 


Sodium   110 L* 


 


Chloride   86 L 


 


Carbon Dioxide   19 L 


 


BUN   19 H 


 


Creatinine   0.50 L 


 


Glucose   156 H 


 


POC Glucose (mg/dL)   


 


Osmolality    247 L*


 


AST   140 H 


 


ALT   49 H 


 


Total Protein   5.9 L 


 


Albumin   2.7 L 


 


Urine Protein   


 


Urine Ketones   


 


Urine Blood   


 


Urine Mucus   














  08/06/23 08/06/23 08/06/23





  19:20 20:37 23:25


 


RBC   


 


Hct   


 


Lymphocytes #   


 


Sodium    112 L*


 


Chloride   


 


Carbon Dioxide   


 


BUN   


 


Creatinine   


 


Glucose   


 


POC Glucose (mg/dL)   147 H 


 


Osmolality   


 


AST   


 


ALT   


 


Total Protein   


 


Albumin   


 


Urine Protein  Trace H  


 


Urine Ketones  1+ H  


 


Urine Blood  Small H  


 


Urine Mucus  Rare H  














  08/07/23 08/07/23





  03:18 03:18


 


RBC   3.59 L


 


Hct   33.5 L


 


Lymphocytes #   0.5 L


 


Sodium  117 L* 


 


Chloride  91 L 


 


Carbon Dioxide  20 L 


 


BUN  


 


Creatinine  0.42 L 


 


Glucose  105 H 


 


POC Glucose (mg/dL)  


 


Osmolality  


 


AST  133 H 


 


ALT  46 H 


 


Total Protein  5.4 L 


 


Albumin  2.5 L 


 


Urine Protein  


 


Urine Ketones  


 


Urine Blood  


 


Urine Mucus  














Assessment and Plan


Assessment: 





* Syncope versus seizure, probably provoked due to hyponatremia.


* Seizure disorder, off Dilantin for last 7 days may have triggered breakthrough

   seizure as well.


* Dilantin rash, severe.  By dermatologist, does not have Jackson-Marlon's.


* Hyponatremia


* Obesity


* Fluid retention.


* Developmental delays, mild











Plan: 





* Agree with holding off on Dilantin because of severe rash.


* Patient started on Keppra 1000 mg twice a day.


* Check EEG to evaluate for interictal epileptiform activity.


* Hyponatremia being managed by nephrology.


* Patient has not had any more seizures since started on Keppra.  


* Other medical management as per IM and other specialties.  Neurology will 

  follow clinically.


* Thank you for the consult.











Time with Patient: Greater than 30

## 2023-08-07 NOTE — P.NPCON
History of Present Illness





- Reason for Consult


hyponatremia





- History of Present Illness


Reason for consultation: Hyponatremia





History of present illness:


Patient is a 77-year-old female seen in consultation for hyponatremia.  Patient 

sustained a fall at home in her bathroom and EMS was called by her neighbor.  

Patient's sodium level was noted to be low at 110 and she received 3% saline 

overnight.  Sodium level has been gradually improving and up to 118 as of this 

morning.  Nonoliguric.  GFR at baseline.  Patient has mental delay.  Patient 

states she was on Dilantin for history of seizures in the past and was stopped 

about a week ago by her dermatologist due to rash.  Patient states no other 

medication for seizure was given.  It is noted in the chart the patient did have

a seizure in the ER and was given Ativan.  She's being followed by neurology.  

She is now on Keppra.  She denies history of diabetes.  She denies personal 

history of malignancy.  I don't see nonsteroidals or any thiazide diuretics and 

her home medication list.  Patient states she lives by herself.





Vital signs are stable.


General: No acute distress.


HEENT: Head exam is unremarkable.  On nasal cannula.


LUNGS: No audible rhonchi or wheezes.


HEART: Rate and Rhythm are regular. 


ABDOMEN: Soft, obese.


EXTREMITITES: 2+ edema.





Past Medical History


Past Medical History: Seizure Disorder, Thyroid Disorder


History of Any Multi-Drug Resistant Organisms: None Reported


Past Surgical History: No Surgical Hx Reported


Additional Past Surgical History / Comment(s): cataract surgery, non-cancerous 

stomach growth removal


Past Psychological History: No Psychological Hx Reported


Smoking Status: Never smoker





Medications and Allergies


                                Home Medications











 Medication  Instructions  Recorded  Confirmed  Type


 


Levothyroxine Sodium 125 mcg PO DAILY 08/20/21 08/06/23 History


 


Phenytoin Sodium Extended 200 mg PO BID 08/20/21 08/06/23 History





[Dilantin]    


 


Primidone 250 mg PO HS 08/20/21 08/06/23 History


 


Potassium Chloride 10 meq PO DAILY 08/23/21 08/06/23 History


 


Betamethasone Dipropionate 1 applic TOPICAL BID 08/06/23 08/06/23 History





[Betamethasone Dipropionate 0.05%]    


 


Cinacalcet [Sensipar] 30 mg PO DAILY 08/06/23 08/06/23 History


 


Hydrocortisone Oint 1 applic TOPICAL BID 08/06/23 08/06/23 History





[Hydrocortisone 2.5% Oint]    


 


Montelukast [Singulair] 10 mg PO DAILY 08/06/23 08/06/23 History


 


predniSONE See Taper PO DAILY 08/06/23 08/07/23 History








                                    Allergies











Allergy/AdvReac Type Severity Reaction Status Date / Time


 


Penicillins Allergy  Rash/Hives Verified 08/06/23 20:18














Physical Exam


Vitals: 


                                   Vital Signs











  Temp Pulse Pulse Resp BP BP BP


 


 08/07/23 07:34       


 


 08/07/23 07:00   68   8 L  111/46  


 


 08/07/23 06:30   65   15  123/69  


 


 08/07/23 06:00   81   14  123/55  


 


 08/07/23 05:30   78   16  136/55  


 


 08/07/23 05:00   75   16  115/46  


 


 08/07/23 04:30   67   14  125/53  


 


 08/07/23 04:00  97.8 F  78   20  125/54  


 


 08/07/23 03:30   72   16  117/71  


 


 08/07/23 03:00   64   16  107/47  


 


 08/07/23 02:30   64   15  147/52  


 


 08/07/23 02:00   68   16  138/53  


 


 08/07/23 01:30   74   16  135/92  


 


 08/07/23 01:00   77   16  119/50  


 


 08/07/23 00:30   66   17  129/101  


 


 08/07/23 00:14   67   15  129/101  


 


 08/07/23 00:00  97.6 F  76   18  153/73  


 


 08/06/23 23:30   80   20  128/60  


 


 08/06/23 23:00   67   14  113/49  


 


 08/06/23 22:47  96.7 F L   78  15   166/73 


 


 08/06/23 22:30   70   15  147/61  


 


 08/06/23 22:15   79   18  112/46  


 


 08/06/23 22:00   64   15  127/59  


 


 08/06/23 21:45   79   19  117/75  


 


 08/06/23 21:30   67   16  135/70  


 


 08/06/23 21:15   80   18  142/63  


 


 08/06/23 21:00   71   15  140/61  


 


 08/06/23 20:45   83   22  166/79  


 


 08/06/23 20:30  97.4 F L   81  18    123/55


 


 08/06/23 20:10  96.7 F L   78  15   166/73 


 


 08/06/23 19:00   69   20  134/59  


 


 08/06/23 18:36   73   24  210/69  


 


 08/06/23 16:22  97.2 F L  77   20  158/75  














  Pulse Ox


 


 08/07/23 07:34  97


 


 08/07/23 07:00  94 L


 


 08/07/23 06:30  93 L


 


 08/07/23 06:00 


 


 08/07/23 05:30  95


 


 08/07/23 05:00  96


 


 08/07/23 04:30  90 L


 


 08/07/23 04:00  95


 


 08/07/23 03:30  97


 


 08/07/23 03:00  97


 


 08/07/23 02:30  93 L


 


 08/07/23 02:00  99


 


 08/07/23 01:30  95


 


 08/07/23 01:00  96


 


 08/07/23 00:30  93 L


 


 08/07/23 00:14  93 L


 


 08/07/23 00:00  94 L


 


 08/06/23 23:30  97


 


 08/06/23 23:00  98


 


 08/06/23 22:47  100


 


 08/06/23 22:30  95


 


 08/06/23 22:15  97


 


 08/06/23 22:00  97


 


 08/06/23 21:45  98


 


 08/06/23 21:30  95


 


 08/06/23 21:15  98


 


 08/06/23 21:00  95


 


 08/06/23 20:45  98


 


 08/06/23 20:30  96


 


 08/06/23 20:10  100


 


 08/06/23 19:00  97


 


 08/06/23 18:36  100


 


 08/06/23 16:22  96








                                Intake and Output











 08/06/23 08/07/23 08/07/23





 22:59 06:59 14:59


 


Intake Total 60 270 


 


Output Total 780 1560 


 


Balance -720 -1290 


 


Intake:   


 


  Intake, IV Titration 60 270 





  Amount   


 


    Sodium Chloride 3%( 60 270 





    Hypertonic) 500 ml @ 30   





    mls/hr IV .[id number] ONE Rx   





    #:[id number]   


 


Output:   


 


  Urine 780 1560 


 


    Uretheral ([place]) 200  


 


Other:   


 


  Voiding Method Indwelling Catheter Indwelling Catheter 


 


  Weight 132.2 kg 132.2 kg 














Results





- Lab Results


                             Most recent lab results











Calcium  8.6 mg/dL (8.4-10.2)   08/07/23  03:18    


 


Phosphorus  3.2 mg/dL (2.5-4.5)   08/07/23  03:18    


 


Magnesium  2.0 mg/dL (1.6-2.3)   08/07/23  03:18    














                                 08/07/23 03:18





                                 08/07/23 07:46





Assessment and Plan


Plan: 





Assessment:


1.  Symptomatic hyponatremia.  Status post 3% saline.  Hypervolemic.  Sodium 

level 118 this morning.  TSH normal.  Urine osmolality 722.


2.  Seizure.  Possibly from hyponatremia.  Dilantin stopped a week prior to 

admission due to rash.  Now on Keppra.  Neurology following.


3.  Edema.


4.  Mental delay.





Plan:


D5W started by ICU team this morning.  I will decrease the rate to 50 mL an 

hour.


Repeat sodium level at 11:30 AM.


Avoid further rise in sodium level at this time.


Goal rate of sodium correction 6-8 mmol/L per 24-hour period.


Follow-up urine sodium level.


Check PTH related peptide.





Thank you for the consultation.  I will continue to follow the patient with you 

during her hospital stay.

## 2023-08-07 NOTE — P.PN
Subjective


Progress Note Date: 08/07/23 (delayed charting seen at 0945)


Patient is a 77-year-old female with a history of hyperparathyroidism, primary 

gastric adenoma with resection, hypothyroidism, and prior seizure disorder who 

presented to the ER with dizziness and weakness.  While in the ER she had a 

witnessed seizure.  Initial laboratory analysis was remarkable for sodium 110, 

chloride 86, carbon dioxide 19, BUN 19, creatinine 0.5.  Patient was started on 

normal saline.  Serum osmolality came back at 247 and urine osmolality of 722.  

Nephrology was consulted and the patient was started on 3% saline.  She had 

serial electrolytes ordered and she was admitted to the ICU.





I received a phone call from the patient's primary care physician Dr. Allen 

this morning.  He reports that he did blood work recently on 8/1/23 and her 

sodium was 132, serum ionized calcium was 5.5.  She started having significant 

rash on July 7 teeth which has been refractory to 2 different oral steroids as 

well as topical steroids.  She was seen by dermatology and had the rash 

biopsied, but he has not been made aware of the biopsy results.  He also notes 

that he discontinued her Dilantin due to her having multiple subtherapeutic 

levels, no history of seizures in over 20 years, I'm concerned that the Dilantin

was related to her dermatitis refractory to steroids.  He also notes that she is

coming due to have a CT abdomen and pelvis that she has a history of a gastric 

adenoma that was resected at [facility] and has had a CT of the past that 

demonstrated lymphadenopathy in the epigastric region.  He also reports that she

was diagnosed with hyperparathyroidism around 11 months ago and has had a 

parathyroid uptake which was negative for adenoma.





Patient seen and examined at bedside.  She is unable to recall much of her 

history.  She denies any current chest pain, shortness breath, nausea, vomiting.

 She is feeling weak.  She does report that her rash is itchy.








Vital signs reviewed


General: nontoxic, no distress, appears at stated age


Gen.: Generalized macular rash over her bilateral shoulder areas, chest, and 

left lower abdomen and left thigh without desquamation, scale, Crestor, or 

purulent drainage.


Cardiovascular: S1S2 reg, no murmur, positive posterior tibial pulse bilateral, 


Lungs: Decreased breath sounds bilateral, no rhonchi, no rales , no accessory 

muscle use


Abdominal: soft,  nontender to palpation, no guarding, no appreciable 

organomegaly


Ext: no gross muscle atrophy, 2+ edema b/l lower extremities, no contractures


Neuro:  CN II-XI grossly intact, no focal neuro deficits


Psych: Alert, oriented, appropriate affect 





Assessment/Plan: 


Severe hyponatremia, possible component of SIADH versus fluid overload


Seizure


-Case discussed with nephrology at length.  We will attempt to slow the 

correction of the sodium levels by starting D5 W and continue to follow serial 

sodium levels, await urine sodium.


-Seizure precautions


-Serial sodium level


-Neurology we'll review: Keppra thousand milligrams twice daily, check EEG, 

further recommendations to follow


-Given the patient has not had a seizure in over 20 years and has had a history 

of gastric malignancy will check head CT





Dermatitis


-Patient started on doxycycline 100 mg twice daily in case this is lentigo


-Attempt to get records from Dr. Sánchez office 





History of gastric adenoma now with recurrent lymphadenopathy on prior CT


-Check CT abdomen and pelvis for recurrence as hyponatremia appears to have a 

possible component of SIADH





Hyperparathyroidism


-Nephrology recommendations


-Patient was on Sensipar at home.  Calcium level currently normal.  We'll check 

ionized calcium in a.m. and continue to hold.





Hypothyroidism


-Synthroid


-TSH within normal





Morbid obesity BMI 48.5


-Outpatient structured weight loss





Imaging: 


None new





Data Review: 


Vitals reviewed in systolic blood pressure and pulse within normal limits.


Labs reviewed and remarkable for sodium 12








DVT prophylaxis: Heparin SC


Anticipated discharge date: Pending Clinical Course


Anticipated discharge place:  Pending Clinical Course





This dictation was prepared using dragon medical voice recognition software. 

Though every attempt is made to correct errors during dictation some may still 

exist. 








Objective





- Vital Signs


Vital signs: 


                                   Vital Signs











Temp  97.9 F   08/07/23 09:30


 


Pulse  75   08/07/23 14:00


 


Resp  13   08/07/23 14:00


 


BP  125/56   08/07/23 14:00


 


Pulse Ox  98   08/07/23 14:00


 


FiO2      








                                 Intake & Output











 08/06/23 08/07/23 08/07/23





 18:59 06:59 18:59


 


Intake Total  330 1150


 


Output Total  2340 475


 


Balance  -2010 675


 


Weight 136.078 kg 132.2 kg 132.2 kg


 


Intake:   


 


  IV   600


 


    Dextrose 5% in Water 1,   400





    000 ml @ 200 mls/hr IV .   





    Q5H [facility] Rx#:[id number]   


 


    Dextrose 5% in Water 1,   200





    000 ml @ 50 mls/hr IV .   





    Q20H [facility] Rx#:[id number]   


 


  Intake, IV Titration  330 





  Amount   


 


    Sodium Chloride 3%(  330 





    Hypertonic) 500 ml @ 30   





    mls/hr IV .T62A30M ONE Rx   





    #:[id number]   


 


  Oral   550


 


Output:   


 


  Urine  2340 475


 


    Uretheral ([place])  200 


 


Other:   


 


  Voiding Method  Indwelling Catheter 














- Labs


CBC & Chem 7: 


                                 08/07/23 03:18





                                 08/07/23 11:54


Labs: 


                  Abnormal Lab Results - Last 24 Hours (Table)











  08/06/23 08/06/23 08/06/23 Range/Units





  17:20 17:20 17:20 


 


RBC  3.59 L    (3.80-5.40)  m/uL


 


Hct  32.8 L    (34.0-46.0)  %


 


Lymphocytes #  0.5 L    (1.0-4.8)  k/uL


 


Sodium   110 L*   (137-145)  mmol/L


 


Chloride   86 L   ()  mmol/L


 


Carbon Dioxide   19 L   (22-30)  mmol/L


 


BUN   19 H   (7-17)  mg/dL


 


Creatinine   0.50 L   (0.52-1.04)  mg/dL


 


Glucose   156 H   (74-99)  mg/dL


 


POC Glucose (mg/dL)     ()  mg/dL


 


Osmolality    247 L*  (280-301)  mosm/kg


 


AST   140 H   (14-36)  U/L


 


ALT   49 H   (4-34)  U/L


 


Total Protein   5.9 L   (6.3-8.2)  g/dL


 


Albumin   2.7 L   (3.5-5.0)  g/dL


 


Urine Protein     (Negative)  


 


Urine Ketones     (Negative)  


 


Urine Blood     (Negative)  


 


Urine Mucus     (None)  /hpf














  08/06/23 08/06/23 08/06/23 Range/Units





  19:20 20:37 23:25 


 


RBC     (3.80-5.40)  m/uL


 


Hct     (34.0-46.0)  %


 


Lymphocytes #     (1.0-4.8)  k/uL


 


Sodium    112 L*  (137-145)  mmol/L


 


Chloride     ()  mmol/L


 


Carbon Dioxide     (22-30)  mmol/L


 


BUN     (7-17)  mg/dL


 


Creatinine     (0.52-1.04)  mg/dL


 


Glucose     (74-99)  mg/dL


 


POC Glucose (mg/dL)   147 H   ()  mg/dL


 


Osmolality     (280-301)  mosm/kg


 


AST     (14-36)  U/L


 


ALT     (4-34)  U/L


 


Total Protein     (6.3-8.2)  g/dL


 


Albumin     (3.5-5.0)  g/dL


 


Urine Protein  Trace H    (Negative)  


 


Urine Ketones  1+ H    (Negative)  


 


Urine Blood  Small H    (Negative)  


 


Urine Mucus  Rare H    (None)  /hpf














  08/07/23 08/07/23 08/07/23 Range/Units





  03:18 03:18 07:46 


 


RBC   3.59 L   (3.80-5.40)  m/uL


 


Hct   33.5 L   (34.0-46.0)  %


 


Lymphocytes #   0.5 L   (1.0-4.8)  k/uL


 


Sodium  117 L*   118 L*  (137-145)  mmol/L


 


Chloride  91 L    ()  mmol/L


 


Carbon Dioxide  20 L    (22-30)  mmol/L


 


BUN     (7-17)  mg/dL


 


Creatinine  0.42 L    (0.52-1.04)  mg/dL


 


Glucose  105 H    (74-99)  mg/dL


 


POC Glucose (mg/dL)     ()  mg/dL


 


Osmolality     (280-301)  mosm/kg


 


AST  133 H    (14-36)  U/L


 


ALT  46 H    (4-34)  U/L


 


Total Protein  5.4 L    (6.3-8.2)  g/dL


 


Albumin  2.5 L    (3.5-5.0)  g/dL


 


Urine Protein     (Negative)  


 


Urine Ketones     (Negative)  


 


Urine Blood     (Negative)  


 


Urine Mucus     (None)  /hpf














  08/07/23 Range/Units





  11:54 


 


RBC   (3.80-5.40)  m/uL


 


Hct   (34.0-46.0)  %


 


Lymphocytes #   (1.0-4.8)  k/uL


 


Sodium  121 L  (137-145)  mmol/L


 


Chloride   ()  mmol/L


 


Carbon Dioxide   (22-30)  mmol/L


 


BUN   (7-17)  mg/dL


 


Creatinine   (0.52-1.04)  mg/dL


 


Glucose   (74-99)  mg/dL


 


POC Glucose (mg/dL)   ()  mg/dL


 


Osmolality   (280-301)  mosm/kg


 


AST   (14-36)  U/L


 


ALT   (4-34)  U/L


 


Total Protein   (6.3-8.2)  g/dL


 


Albumin   (3.5-5.0)  g/dL


 


Urine Protein   (Negative)  


 


Urine Ketones   (Negative)  


 


Urine Blood   (Negative)  


 


Urine Mucus   (None)  /hpf

## 2023-08-08 LAB
ANION GAP SERPL CALC-SCNC: 5 MMOL/L
BASOPHILS # BLD AUTO: 0 K/UL (ref 0–0.2)
BASOPHILS NFR BLD AUTO: 0 %
BUN SERPL-SCNC: 16 MG/DL (ref 7–17)
CALCIUM SPEC-MCNC: 8.7 MG/DL (ref 8.4–10.2)
CHLORIDE SERPL-SCNC: 93 MMOL/L (ref 98–107)
CO2 SERPL-SCNC: 20 MMOL/L (ref 22–30)
EOSINOPHIL # BLD AUTO: 0.3 K/UL (ref 0–0.7)
EOSINOPHIL NFR BLD AUTO: 4 %
ERYTHROCYTE [DISTWIDTH] IN BLOOD BY AUTOMATED COUNT: 3.81 M/UL (ref 3.8–5.4)
ERYTHROCYTE [DISTWIDTH] IN BLOOD: 14.6 % (ref 11.5–15.5)
GLUCOSE SERPL-MCNC: 114 MG/DL (ref 74–99)
HCT VFR BLD AUTO: 35.6 % (ref 34–46)
HGB BLD-MCNC: 11.9 GM/DL (ref 11.4–16)
LYMPHOCYTES # SPEC AUTO: 0.6 K/UL (ref 1–4.8)
LYMPHOCYTES NFR SPEC AUTO: 9 %
MCH RBC QN AUTO: 31.3 PG (ref 25–35)
MCHC RBC AUTO-ENTMCNC: 33.5 G/DL (ref 31–37)
MCV RBC AUTO: 93.4 FL (ref 80–100)
MONOCYTES # BLD AUTO: 0.7 K/UL (ref 0–1)
MONOCYTES NFR BLD AUTO: 10 %
NEUTROPHILS # BLD AUTO: 5.1 K/UL (ref 1.3–7.7)
NEUTROPHILS NFR BLD AUTO: 74 %
PLATELET # BLD AUTO: 178 K/UL (ref 150–450)
POTASSIUM SERPL-SCNC: 4.5 MMOL/L (ref 3.5–5.1)
SODIUM SERPL-SCNC: 118 MMOL/L (ref 137–145)
WBC # BLD AUTO: 6.9 K/UL (ref 3.8–10.6)

## 2023-08-08 PROCEDURE — 05HF33Z INSERTION OF INFUSION DEVICE INTO LEFT CEPHALIC VEIN, PERCUTANEOUS APPROACH: ICD-10-PCS

## 2023-08-08 RX ADMIN — PANTOPRAZOLE SODIUM SCH MG: 40 INJECTION, POWDER, FOR SOLUTION INTRAVENOUS at 09:29

## 2023-08-08 RX ADMIN — DOXYCYCLINE SCH MG: 100 CAPSULE ORAL at 09:24

## 2023-08-08 RX ADMIN — HYDROCORTISONE SCH APPLIC: 1 CREAM TOPICAL at 09:24

## 2023-08-08 RX ADMIN — LEVETIRACETAM SCH MG: 100 INJECTION, SOLUTION, CONCENTRATE INTRAVENOUS at 09:24

## 2023-08-08 RX ADMIN — LEVOTHYROXINE SODIUM SCH MCG: 0.12 TABLET ORAL at 06:01

## 2023-08-08 RX ADMIN — HEPARIN SODIUM SCH UNIT: 5000 INJECTION, SOLUTION INTRAVENOUS; SUBCUTANEOUS at 17:44

## 2023-08-08 RX ADMIN — FERRIC OXIDE RED PRN APPLIC: 8; 8 LOTION TOPICAL at 09:23

## 2023-08-08 RX ADMIN — FUROSEMIDE SCH MG: 10 INJECTION, SOLUTION INTRAMUSCULAR; INTRAVENOUS at 21:01

## 2023-08-08 RX ADMIN — DOXYCYCLINE SCH MG: 100 CAPSULE ORAL at 21:01

## 2023-08-08 RX ADMIN — LEVETIRACETAM SCH MG: 100 INJECTION, SOLUTION, CONCENTRATE INTRAVENOUS at 21:01

## 2023-08-08 RX ADMIN — HYDROCORTISONE SCH APPLIC: 1 CREAM TOPICAL at 21:03

## 2023-08-08 RX ADMIN — HEPARIN SODIUM SCH UNIT: 5000 INJECTION, SOLUTION INTRAVENOUS; SUBCUTANEOUS at 09:23

## 2023-08-08 NOTE — P.PN
Subjective





Patient is seen in follow-up for hyponatremia.  Sodium level up to 119 this 

morning.  She received 20 mg IV Lasix last night and 40 mg IV this morning.  

Nonoliguric.  Oral intake is just fair.  Hemodynamically stable.





Vital signs are stable.


General: No acute distress.


HEENT: Head exam is unremarkable. 


LUNGS: No audible rhonchi or wheezes.


HEART: Rate and Rhythm are regular.


ABDOMEN: Soft, obese.


EXTREMITITES: 2+ edema.





Objective





- Vital Signs


Vital signs: 


                                   Vital Signs











Temp  98.4 F   08/08/23 08:00


 


Pulse  102 H  08/08/23 09:00


 


Resp  10 L  08/08/23 09:00


 


BP  120/53   08/08/23 09:00


 


Pulse Ox  96   08/08/23 09:00


 


FiO2      








                                 Intake & Output











 08/07/23 08/08/23 08/08/23





 18:59 06:59 18:59


 


Intake Total 2868  


 


Output Total 1800 815 600


 


Balance 1068 -815 -600


 


Weight 132.2 kg 131.3 kg 


 


Intake:   


 


  IV 1000  


 


    Dextrose 5% in Water 1, 800  





    000 ml @ 200 mls/hr IV .   





    Q5H [facility] Rx#:[id number]   


 


    Dextrose 5% in Water 1, 200  





    000 ml @ 50 mls/hr IV .   





    Q20H [facility] Rx#:[id number]   


 


  Oral 1868  


 


Output:   


 


  Urine 1800 815 600


 


Other:   


 


  Voiding Method Indwelling Catheter Indwelling Catheter 


 


  # Bowel Movements   1














- Labs


CBC & Chem 7: 


                                 08/08/23 04:55





                                 08/08/23 09:48


Labs: 


                  Abnormal Lab Results - Last 24 Hours (Table)











  08/06/23 08/06/23 08/07/23 Range/Units





  23:25 23:53 11:54 


 


Lymphocytes #     (1.0-4.8)  k/uL


 


Sodium    121 L  (137-145)  mmol/L


 


Chloride     ()  mmol/L


 


Carbon Dioxide     (22-30)  mmol/L


 


Glucose     (74-99)  mg/dL


 


Ur Random Sodium   <20 L   ()  mmol/L


 


Free Phenytoin  <0.8 L    (0.8-2.0)  ug/mL














  08/07/23 08/07/23 08/07/23 Range/Units





  15:28 19:28 23:27 


 


Lymphocytes #     (1.0-4.8)  k/uL


 


Sodium  116 L*  118 L*  118 L*  (137-145)  mmol/L


 


Chloride     ()  mmol/L


 


Carbon Dioxide     (22-30)  mmol/L


 


Glucose     (74-99)  mg/dL


 


Ur Random Sodium     ()  mmol/L


 


Free Phenytoin     (0.8-2.0)  ug/mL














  08/08/23 08/08/23 08/08/23 Range/Units





  04:55 04:55 09:48 


 


Lymphocytes #  0.6 L    (1.0-4.8)  k/uL


 


Sodium   118 L*  119 L*  (137-145)  mmol/L


 


Chloride   93 L   ()  mmol/L


 


Carbon Dioxide   20 L   (22-30)  mmol/L


 


Glucose   114 H   (74-99)  mg/dL


 


Ur Random Sodium     ()  mmol/L


 


Free Phenytoin     (0.8-2.0)  ug/mL














Assessment and Plan


Plan: 





Assessment:


1.  Symptomatic hyponatremia.  Status post 3% saline.  Hypervolemic.  Sodium 

level 119 this morning.  TSH normal.  Urine osmolality 722.  Urine sodium less 

than 20.  Cortisol level not low.


2.  Seizure.  Possibly from hyponatremia.  Dilantin stopped a week prior to 

admission due to rash.  Now on Keppra.  Neurology following.


3.  Edema.


4.  Mental delay.





Plan:


Add 1200 mL fluid restriction.


Add IV Lasix 40 mg twice daily.


Repeat sodium level this evening.


If sodium level not rising, will give dose of samsca.


Avoid normal saline patient is hypervolemic.


Goal rate of sodium correction 6-8 mmol/L per 24-hour period.


Follow-up PTH related peptide.

## 2023-08-08 NOTE — P.PN
Subjective


Progress Note Date: 08/08/23





Hospital Course: 


77-year-old female with a history of hyperparathyroidism, primary gastric 

adenoma with resection, hypothyroidism, and prior seizure disorder who presented

to the ER with dizziness and weakness.  While in the ER she had a witnessed 

seizure.  Initial laboratory analysis was remarkable for sodium 110, chloride 

86, carbon dioxide 19, BUN 19, creatinine 0.5.  Patient was started on normal 

saline.  Serum osmolality came back at 247 and urine osmolality of 722.  

Nephrology was consulted and the patient was started on 3% saline.  She had 

serial electrolytes ordered and she was admitted to the ICU. Per PCP , blood 

work recently on 8/1/23 and her sodium was 132, serum ionized calcium was 5.5.  

She started having significant rash on last month which has been refractory to 2

different oral steroids as well as topical steroids.  She was seen by 

dermatology and had the rash biopsied, but he has not been made aware of the 

biopsy results.  He also notes that he discontinued her Dilantin due to her 

having multiple subtherapeutic levels, no history of seizures in over 20 years, 

Concerned that Dilantin was related to her dermatitis refractory to steroids.  

He also notes that she is coming due to have a CT abdomen and pelvis that she 

has a history of a gastric adenoma that was resected at [facility] and has had a

CT of the past that demonstrated lymphadenopathy in the epigastric region.  He 

also reports that she was diagnosed with hyperparathyroidism around 11 months 

ago and has had a parathyroid uptake which was negative for adenoma.














Subjective: 


Patient seen and examined at bedside.  No acute events overnight.  More alert 

and oriented.  Denies any new complaints.  Has a Trujillo catheter in place.





Pertinent positives and negatives as discussed above, a complete review of 

systems was performed and all other systems are negative.








Vitals Signs Reviewed. 





General: nontoxic, no distress, appears at stated age


Gen.: Generalized macular rash over her bilateral shoulder areas, chest, and 

left lower abdomen and left thigh without desquamation, scale, Crust, or 

purulent drainage.


Cardiovascular: S1S2 reg, no murmur, positive posterior tibial pulse bilateral, 


Lungs: Decreased breath sounds bilateral, no rhonchi, no rales , no accessory 

muscle use


Abdominal: soft,  nontender to palpation, no guarding, no appreciable 

organomegaly


Ext: no gross muscle atrophy, 2+ edema b/l lower extremities, no contractures


Neuro:  CN II-XI grossly intact, no focal neuro deficits


Psych: Alert, oriented, appropriate affect 





Data Reviewed Today: 


Pertinent Labs: WBC 6.9, hemoglobin 11.9, sodium 119, chloride 93, bicarb 20, 

creatinine 0.54


Imaging: CT abdomen and pelvis shows retroperitoneal lymphadenopathy





Assessment and Plan:


Severe hyponatremia, likely hypovolemic


Seizure with history of seizures


-Patient remains in medical ICU on Seizure precautions


-Nephrology following


-Serial sodium level


-Sodium level slowly improving


-Status post 3% saline


-ICU note reviewed, likely related to hypervolemia


-Patient placed on 40 mg IV Lasix per nephrology


-Neurology following, patient on thousand milligram IV twice a day Keppra


-CT head did not show any acute process


-EEG did not show any epileptiform activity





Dermatitis


-Patient started on doxycycline 100 mg twice daily in case this is lentigo


-Attempt to get records from Dr. Sánchez office 


-Also started on hydrocortisone cream topical, and calamine lotion





History of gastric adenoma now with recurrent lymphadenopathy on prior CT


-Retroperitoneal adenopathy persistent





Hyperparathyroidism


-Nephrology recommendations


-Patient was on Sensipar at home


-PTH RP level pending





Hypothyroidism


-Synthroid


-TSH within normal





Morbid obesity BMI 48.5


-Outpatient structured weight loss








DVT ppx: Heparin subcu





Code status: Full code





Anticipated discharge place: Pending clinical course


Anticipated discharge time: Pending clinical course








Objective





- Vital Signs


Vital signs: 


                                   Vital Signs











Temp  98.4 F   08/08/23 08:00


 


Pulse  102 H  08/08/23 09:00


 


Resp  10 L  08/08/23 09:00


 


BP  120/53   08/08/23 09:00


 


Pulse Ox  96   08/08/23 09:00


 


FiO2      








                                 Intake & Output











 08/07/23 08/08/23 08/08/23





 18:59 06:59 18:59


 


Intake Total 2868  


 


Output Total 1800 815 600


 


Balance 1068 -815 -600


 


Weight 132.2 kg 131.3 kg 


 


Intake:   


 


  IV 1000  


 


    Dextrose 5% in Water 1, 800  





    000 ml @ 200 mls/hr IV .   





    Q5H [facility] Rx#:[id number]   


 


    Dextrose 5% in Water 1, 200  





    000 ml @ 50 mls/hr IV .   





    Q20H [facility] Rx#:[id number]   


 


  Oral 1868  


 


Output:   


 


  Urine 1800 815 600


 


Other:   


 


  Voiding Method Indwelling Catheter Indwelling Catheter 


 


  # Bowel Movements   1














- Labs


CBC & Chem 7: 


                                 08/08/23 04:55





                                 08/08/23 09:48


Labs: 


                  Abnormal Lab Results - Last 24 Hours (Table)











  08/06/23 08/06/23 08/07/23 Range/Units





  23:25 23:53 11:54 


 


Lymphocytes #     (1.0-4.8)  k/uL


 


Sodium    121 L  (137-145)  mmol/L


 


Chloride     ()  mmol/L


 


Carbon Dioxide     (22-30)  mmol/L


 


Glucose     (74-99)  mg/dL


 


Ur Random Sodium   <20 L   ()  mmol/L


 


Free Phenytoin  <0.8 L    (0.8-2.0)  ug/mL














  08/07/23 08/07/23 08/07/23 Range/Units





  15:28 19:28 23:27 


 


Lymphocytes #     (1.0-4.8)  k/uL


 


Sodium  116 L*  118 L*  118 L*  (137-145)  mmol/L


 


Chloride     ()  mmol/L


 


Carbon Dioxide     (22-30)  mmol/L


 


Glucose     (74-99)  mg/dL


 


Ur Random Sodium     ()  mmol/L


 


Free Phenytoin     (0.8-2.0)  ug/mL














  08/08/23 08/08/23 08/08/23 Range/Units





  04:55 04:55 09:48 


 


Lymphocytes #  0.6 L    (1.0-4.8)  k/uL


 


Sodium   118 L*  119 L*  (137-145)  mmol/L


 


Chloride   93 L   ()  mmol/L


 


Carbon Dioxide   20 L   (22-30)  mmol/L


 


Glucose   114 H   (74-99)  mg/dL


 


Ur Random Sodium     ()  mmol/L


 


Free Phenytoin     (0.8-2.0)  ug/mL

## 2023-08-08 NOTE — CA
Transthoracic Echo Report 

 Name: Ilana Schulte 

 MRN:    [id number] 

 Age:    77     Gender:     F 

 

 :    1945 

 Exam Date:     2023 14:57 

 Exam Location: [facility] 

 Ht (in):     65     Wt (lb):     291 

 Ordering Physician:        Trudy Robles DO 

 Attending/Referring Phys:         [id number], Travis 

 Technician         Bethel Lynch 

 Procedure CPT: 

 Indications:       chf 

 

 Cardiac Hx: 

 Technical Quality:      Very technically difficult study 

 Contrast 1:                                Total Dose (mL): 

 Contrast 2:                                Total Dose (mL): 

 

 MEASUREMENTS  (Male / Female) Normal Values 

 

 

 

 

 FINDINGS 

 Left Ventricle 

 Normal LV size and wall thickness. Left ventricular ejection fraction is  

 estimated at 50-55 %. 

 

 Right Ventricle 

 Normal right ventricular size. 

 

 Right Atrium 

 Normal right atrial size. 

 

 Left Atrium 

 Normal left atrial size. 

 

 Mitral Valve 

 Mild posterior annulus calcification. 

 

 Aortic Valve 

 Grossly normal AV. No aortic valve stenosis or regurgitation as visualized. 

 

 Tricuspid Valve 

 Tricuspid valve not well visualized. Mild TR. 

 

 Pulmonic Valve 

 Pulmonic valve not well visualized. No pulmonic regurgitation. 

 

 Pericardium 

 

 

 Aorta 

 Normal size aortic root . 

 

 CONCLUSIONS 

 Study limited to parasternal views only. Patient requested the study to be  

 ended secondary to skin rash chest pain.  Limited views because patient did not  

 wish to continue the study.  Available views suggest normal LV systolic  

 function.  Doppler exam is suboptimal on inadequate because of patient's  

 request 

 Previewed by:  

 Dr. Tamar Reyez MD 

 (Electronically Signed) 

 Final Date:      2023 10:24

## 2023-08-08 NOTE — EEG
ELECTROENCEPHALOGRAM REPORT



PREAMBLE:

This is a 77-year-old female with history of seizure disorder, has a 
breakthrough

seizure.



EEG FINDINGS:

This is a 21-channel digital EEG recorded with video component, utilizing 10/20

international system with referential and bipolar montages.  Background consists
of

moderately well-developed and regulated, somewhat low amplitude of fast 
frequency beta

activity intermixed with some theta activity seen in bihemispheric region.  
Background

seems to be slightly reactive to eye opening and closing.  Photic driving 
response was

not clearly seen.  Different stages of sleep were not seen.  No focal or 
generalized

epileptiform activity was seen.



IMPRESSION:

This is a mildly abnormal EEG due to presence of excessive low voltage fast 
frequency activity

in bihemispheric region, suggestive of medication effect.  No epileptiform 
activity was seen.





MMMIRNA / GREALDN: [id number] / Job#: [id number]

MTDD

## 2023-08-08 NOTE — P.PN
Subjective


Progress Note Date: 08/08/23


Principal diagnosis: 





Acute hyponatremia and breakthrough seizure





I am seeing this patient in new consultation today and 08/07/2023 for 

symptomatic hyponatremia.  Patient is a 77-year-old white female past medical 

history significant for seizure disorder, hypothyroidism, and morbid obesity.  

The patient is a questionable historian.  Apparently, the patient presented to 

the emergency room yesterday afternoon with severe generalized weakness and fall

while in the bathroom.  She denies hitting her head.  There is no obvious head 

trauma.  On arrival to the emergency room, the patient's sodium was found to be 

severely low at 110. She denies starting any new medications, malignancies. She 

did reportedly have a seizure while emergency room, which was not described and 

I'm uncertain of timeframe.  It does appear that it was terminated with 2 mg of 

Ativan.  The patient was loaded with Keppra.  This is likely due to the 

patient's hyponatremia, however, the patient's Dilantin was reportedly stopped 

7-10 days ago.  The patient did develop a rash on her chest and bilateral arms. 

This may have developed between 1-2 weeks ago; as stated before, the patient is 

a poor historian. No reported recent seizure in over 10 years.  Phenytoin is 

known to cause Jackson-Marlon syndrome, however, this is felt to be less likely

the patient has chronically been on this medication.  The rash is localized to 

her chest, upper back, and bilateral arms.  It spares mucosal membranes.  Rash i

s erythemic with yellow crust.  Patient describes the rash as pruritic and 

painful. No fever. Patient has been started on doxycycline.  CBC on arrival is 

unremarkable.  No leukocytosis. BMP on arrival showed a sodium of 110, potassium

4.7, chloride 86, serum bicarb 19, BUN 19, creatinine 0.5, glucose 156.  Serum 

osmolality was low at 247.  Urine osmolality was 722.  Most recent sodium was 

112.  TSH and cortisol levels were within range.  Hypertonic 3% saline is 

infusing at 30 MLS per hour per nephrology.  Patient is edematous.  There is a 

Trujillo catheter with adequate urine production. No further seizure activity noted

by nursing staff.  Patient is currently sitting up in bed, on room air, in no 

acute distress.  She is drowsy but oriented.  She is unable to provide specific 

dates or times.  She has nonfocal generalized weakness throughout. Vital signs 

are stable.





Today on 8/8/2023, doing well, no further episodes of witnessed seizures, sodium

is up to 118 today, patient is not receiving any IV fluid at this point, does 

not seem to be in any distress, she is doing great.  She is off the hypertonic 

saline, her sodium has been fluctuating between 116 and 118 this morning.  Hence

I will recommend a slow infusion of 0.9 normal saline, planning to get her 

sodium up to 123 in the next 12 hours, and hopefully we can transfer the patient

out of the ICU to a regular medical floor.  Patient is relatively asymptomatic, 

her WBC count 6.9 hemoglobin is 11.9.  Electrolytes are relatively unremarkable 

except for a sodium of 118











Objective





- Vital Signs


Vital signs: 


                                   Vital Signs











Temp  98.4 F   08/08/23 08:00


 


Pulse  102 H  08/08/23 09:00


 


Resp  10 L  08/08/23 09:00


 


BP  120/53   08/08/23 09:00


 


Pulse Ox  96   08/08/23 09:00


 


FiO2      








                                 Intake & Output











 08/07/23 08/08/23 08/08/23





 18:59 06:59 18:59


 


Intake Total 2868  


 


Output Total 1800 815 600


 


Balance 1068 -815 -600


 


Weight 132.2 kg 131.3 kg 


 


Intake:   


 


  IV 1000  


 


    Dextrose 5% in Water 1, 800  





    000 ml @ 200 mls/hr IV .   





    Q5H SASHA Rx#:[id number]   


 


    Dextrose 5% in Water 1, 200  





    000 ml @ 50 mls/hr IV .   





    Q20H SASHA Rx#:[id number]   


 


  Oral 1868  


 


Output:   


 


  Urine 1800 815 600


 


Other:   


 


  Voiding Method Indwelling Catheter Indwelling Catheter 


 


  # Bowel Movements   1














- Exam





Physical Exam revealed a 77-year-old female obese in no distress


Head: Atraumatic, normocephalic.


HEENT:[Neck is supple.] [No neck masses.] [No thyromegaly.] [No JVD.]


Chest: [Clear throughout, no crackles, no rhonchi, no wheezes.]


Cardiac Exam: [Normal S1 and S2, no S3 gallop, no murmur.]


Abdomen: [Soft, nontender,  no megaly, no rebound, no guarding, normal bowel 

sounds.]


Extremities: [No clubbing, trace of bipedal edema, no cyanosis.]


Neurological Exam: [No focal neurologic deficit.],  Alert oriented 3.


Skin: Diffuse rash noted throughout the whole body especially the trunk area 

with erythema and yellow crusting





- Labs


CBC & Chem 7: 


                                 08/08/23 04:55





                                 08/08/23 09:48


Labs: 


                  Abnormal Lab Results - Last 24 Hours (Table)











  08/06/23 08/06/23 08/07/23 Range/Units





  23:25 23:53 11:54 


 


Lymphocytes #     (1.0-4.8)  k/uL


 


Sodium    121 L  (137-145)  mmol/L


 


Chloride     ()  mmol/L


 


Carbon Dioxide     (22-30)  mmol/L


 


Glucose     (74-99)  mg/dL


 


Ur Random Sodium   <20 L   ()  mmol/L


 


Free Phenytoin  <0.8 L    (0.8-2.0)  ug/mL














  08/07/23 08/07/23 08/07/23 Range/Units





  15:28 19:28 23:27 


 


Lymphocytes #     (1.0-4.8)  k/uL


 


Sodium  116 L*  118 L*  118 L*  (137-145)  mmol/L


 


Chloride     ()  mmol/L


 


Carbon Dioxide     (22-30)  mmol/L


 


Glucose     (74-99)  mg/dL


 


Ur Random Sodium     ()  mmol/L


 


Free Phenytoin     (0.8-2.0)  ug/mL














  08/08/23 08/08/23 08/08/23 Range/Units





  04:55 04:55 09:48 


 


Lymphocytes #  0.6 L    (1.0-4.8)  k/uL


 


Sodium   118 L*  119 L*  (137-145)  mmol/L


 


Chloride   93 L   ()  mmol/L


 


Carbon Dioxide   20 L   (22-30)  mmol/L


 


Glucose   114 H   (74-99)  mg/dL


 


Ur Random Sodium     ()  mmol/L


 


Free Phenytoin     (0.8-2.0)  ug/mL














Assessment and Plan


Assessment: 





Impression


Acute hypovolemic hyponatremia, this is not consistent with hypervolemic 

hyponatremia mostly because of the urine osmolality is way too high to correlate

with hypervolemia.  I also doubt SIADH, patient did not seem to be euvolemic.  

And again her serum osmolality is way too high to explain this.


Seizure disorder/breakthrough seizure could be exacerbated by the profound 

hyponatremia on presentation.


Bactrim induced dermatitis, being addressed by dermatology.  Patient used to be 

on Dilantin which was placed on hold.


History of hypothyroidism


Morbid obesity BMI of 49








Recommendation:


Continue to monitor in the ICU for now


No more 3% saline


Patient could be given a very small amount of 0.9 normal saline to get her 

sodium up to the 123 range in the next 12 hours and then hopefully can transfer 

out of the ICU.  Once sodium is up to 123, we will address the IV fluid again.


Continue seizure precautions and seizure medications, this is being addressed by

neurology


Continue cortisone cream for the rash


Continue DVT prophylaxis and GI prophylaxis


We'll continue to follow.


Time with Patient: Less than 30 Detail Level: Detailed Detail Level: Generalized

## 2023-08-08 NOTE — P.PN
Subjective


Progress Note Date: 08/08/23





Patient was seen for a follow-up.  Patient is laying comfortably in the bed.  

States rashes getting better.  No further seizures.  Patient became slightly 

edgy when asked about questions for orientation.  Patient said "don't tell me 

that again".





Objective





- Vital Signs


Vital signs: 


                                   Vital Signs











Temp  99 F   08/08/23 12:00


 


Pulse  86   08/08/23 18:00


 


Resp  20   08/08/23 18:00


 


BP  110/58   08/08/23 18:00


 


Pulse Ox  94 L  08/08/23 18:00


 


FiO2      








                                 Intake & Output











 08/08/23 08/08/23 08/09/23





 06:59 18:59 06:59


 


Intake Total  730 


 


Output Total 815 1225 


 


Balance -815 -495 


 


Weight 131.3 kg  


 


Intake:   


 


  Oral  730 


 


Output:   


 


  Urine 815 1225 


 


Other:   


 


  Voiding Method Indwelling Catheter Indwelling Catheter 


 


  # Bowel Movements  1 














- Labs


CBC & Chem 7: 


                                 08/08/23 04:55





                                 08/08/23 15:36


Labs: 


                  Abnormal Lab Results - Last 24 Hours (Table)











  08/06/23 08/06/23 08/07/23 Range/Units





  23:25 23:53 19:28 


 


Lymphocytes #     (1.0-4.8)  k/uL


 


Sodium    118 L*  (137-145)  mmol/L


 


Chloride     ()  mmol/L


 


Carbon Dioxide     (22-30)  mmol/L


 


Glucose     (74-99)  mg/dL


 


Ur Random Sodium   <20 L   ()  mmol/L


 


Free Phenytoin  <0.8 L    (0.8-2.0)  ug/mL














  08/07/23 08/08/23 08/08/23 Range/Units





  23:27 04:55 04:55 


 


Lymphocytes #   0.6 L   (1.0-4.8)  k/uL


 


Sodium  118 L*   118 L*  (137-145)  mmol/L


 


Chloride    93 L  ()  mmol/L


 


Carbon Dioxide    20 L  (22-30)  mmol/L


 


Glucose    114 H  (74-99)  mg/dL


 


Ur Random Sodium     ()  mmol/L


 


Free Phenytoin     (0.8-2.0)  ug/mL














  08/08/23 08/08/23 Range/Units





  09:48 15:36 


 


Lymphocytes #    (1.0-4.8)  k/uL


 


Sodium  119 L*  121 L  (137-145)  mmol/L


 


Chloride    ()  mmol/L


 


Carbon Dioxide    (22-30)  mmol/L


 


Glucose    (74-99)  mg/dL


 


Ur Random Sodium    ()  mmol/L


 


Free Phenytoin    (0.8-2.0)  ug/mL














Assessment and Plan


Assessment: 





* Syncope versus seizure, probably provoked due to hyponatremia.


* Seizure disorder, off Dilantin for last 7 days may have triggered breakthrough

  seizure as well.


* Dilantin rash, severe.  Per dermatologist, does not have Jackson-Marlon's.


* Hyponatremia


* Obesity


* Fluid retention.


* Developmental delays, mild











Plan: 





* Agree with discontinuing Dilantin because of severe rash.  Rash has improved 

  since being off Dilantin.


* Patient started on Keppra 1000 mg twice a day.  Patient is tolerating it well 

  so far.  Watch for behavioral issues related to Keppra.  In that case may have

  to switch to a different medication.


* EEG performed 08/07/2023 was borderline abnormal because of presence of 

  excessive amount of low voltage fast frequency beta activity suggestive of 

  medication effect.  No epileptiform activity was seen.  


* Hyponatremia being managed by nephrology.  Most recent sodium 121.


* Patient has not had any more seizures since started on Keppra.  


* Other medical management as per IM and other specialties.  Neurology will 

  follow clinically.

## 2023-08-09 LAB
ANION GAP SERPL CALC-SCNC: 6 MMOL/L
BASOPHILS # BLD AUTO: 0.1 K/UL (ref 0–0.2)
BASOPHILS NFR BLD AUTO: 1 %
BUN SERPL-SCNC: 24 MG/DL (ref 7–17)
CALCIUM SPEC-MCNC: 8.9 MG/DL (ref 8.4–10.2)
CHLORIDE SERPL-SCNC: 96 MMOL/L (ref 98–107)
CO2 SERPL-SCNC: 20 MMOL/L (ref 22–30)
EOSINOPHIL # BLD AUTO: 0.3 K/UL (ref 0–0.7)
EOSINOPHIL NFR BLD AUTO: 4 %
ERYTHROCYTE [DISTWIDTH] IN BLOOD BY AUTOMATED COUNT: 3.84 M/UL (ref 3.8–5.4)
ERYTHROCYTE [DISTWIDTH] IN BLOOD: 14.8 % (ref 11.5–15.5)
GLUCOSE SERPL-MCNC: 133 MG/DL (ref 74–99)
HCT VFR BLD AUTO: 36.6 % (ref 34–46)
HGB BLD-MCNC: 12.1 GM/DL (ref 11.4–16)
LYMPHOCYTES # SPEC AUTO: 0.7 K/UL (ref 1–4.8)
LYMPHOCYTES NFR SPEC AUTO: 9 %
MAGNESIUM SPEC-SCNC: 2.1 MG/DL (ref 1.6–2.3)
MCH RBC QN AUTO: 31.6 PG (ref 25–35)
MCHC RBC AUTO-ENTMCNC: 33.2 G/DL (ref 31–37)
MCV RBC AUTO: 95.2 FL (ref 80–100)
MONOCYTES # BLD AUTO: 0.8 K/UL (ref 0–1)
MONOCYTES NFR BLD AUTO: 10 %
NEUTROPHILS # BLD AUTO: 5.5 K/UL (ref 1.3–7.7)
NEUTROPHILS NFR BLD AUTO: 73 %
PLATELET # BLD AUTO: 168 K/UL (ref 150–450)
POTASSIUM SERPL-SCNC: 4.7 MMOL/L (ref 3.5–5.1)
SODIUM SERPL-SCNC: 122 MMOL/L (ref 137–145)
WBC # BLD AUTO: 7.6 K/UL (ref 3.8–10.6)

## 2023-08-09 RX ADMIN — DOXYCYCLINE SCH MG: 100 CAPSULE ORAL at 09:40

## 2023-08-09 RX ADMIN — PANTOPRAZOLE SODIUM SCH MG: 40 INJECTION, POWDER, FOR SOLUTION INTRAVENOUS at 09:39

## 2023-08-09 RX ADMIN — LEVETIRACETAM SCH MG: 100 INJECTION, SOLUTION, CONCENTRATE INTRAVENOUS at 20:34

## 2023-08-09 RX ADMIN — HEPARIN SODIUM SCH UNIT: 5000 INJECTION, SOLUTION INTRAVENOUS; SUBCUTANEOUS at 17:45

## 2023-08-09 RX ADMIN — HEPARIN SODIUM SCH UNIT: 5000 INJECTION, SOLUTION INTRAVENOUS; SUBCUTANEOUS at 23:19

## 2023-08-09 RX ADMIN — HEPARIN SODIUM SCH UNIT: 5000 INJECTION, SOLUTION INTRAVENOUS; SUBCUTANEOUS at 09:40

## 2023-08-09 RX ADMIN — FERRIC OXIDE RED PRN APPLIC: 8; 8 LOTION TOPICAL at 20:35

## 2023-08-09 RX ADMIN — DOXYCYCLINE SCH MG: 100 CAPSULE ORAL at 21:04

## 2023-08-09 RX ADMIN — FUROSEMIDE SCH MG: 10 INJECTION, SOLUTION INTRAMUSCULAR; INTRAVENOUS at 09:39

## 2023-08-09 RX ADMIN — HEPARIN SODIUM SCH UNIT: 5000 INJECTION, SOLUTION INTRAVENOUS; SUBCUTANEOUS at 00:00

## 2023-08-09 RX ADMIN — HYDROCORTISONE SCH APPLIC: 1 CREAM TOPICAL at 09:40

## 2023-08-09 RX ADMIN — HYDROCORTISONE SCH APPLIC: 1 CREAM TOPICAL at 20:35

## 2023-08-09 RX ADMIN — FUROSEMIDE SCH MG: 10 INJECTION, SOLUTION INTRAMUSCULAR; INTRAVENOUS at 20:34

## 2023-08-09 RX ADMIN — LEVETIRACETAM SCH MG: 100 INJECTION, SOLUTION, CONCENTRATE INTRAVENOUS at 09:39

## 2023-08-09 RX ADMIN — LEVOTHYROXINE SODIUM SCH MCG: 0.12 TABLET ORAL at 07:10

## 2023-08-09 NOTE — P.PN
Subjective





Patient is seen in follow-up for hyponatremia.  Sodium level up to 122 this 

morning.  Maintained on IV Lasix.  Nonoliguric.  Oral intake is good.  On fluid 

restriction. Hemodynamically stable.





Vital signs are stable.


General: No acute distress.


HEENT: Head exam is unremarkable. 


LUNGS: No audible rhonchi or wheezes.


HEART: Rate and Rhythm are regular.


ABDOMEN: Soft, obese.


EXTREMITITES: 2+ edema.





Objective





- Vital Signs


Vital signs: 


                                   Vital Signs











Temp  98.1 F   08/09/23 08:00


 


Pulse  101 H  08/09/23 11:00


 


Resp  18   08/09/23 11:00


 


BP  130/48   08/09/23 11:00


 


Pulse Ox  95   08/09/23 11:00


 


FiO2      








                                 Intake & Output











 08/08/23 08/09/23 08/09/23





 18:59 06:59 18:59


 


Intake Total 730  650


 


Output Total 1225 1410 425


 


Balance -495 -1410 225


 


Weight  129.6 kg 


 


Intake:   


 


  Oral 730  650


 


Output:   


 


  Urine 1225 1410 425


 


Other:   


 


  Voiding Method Indwelling Catheter Indwelling Catheter Indwelling Catheter


 


  # Bowel Movements 1  














- Labs


CBC & Chem 7: 


                                 08/09/23 03:32





                                 08/09/23 03:32


Labs: 


                  Abnormal Lab Results - Last 24 Hours (Table)











  08/08/23 08/08/23 08/09/23 Range/Units





  15:36 20:05 00:07 


 


Lymphocytes #     (1.0-4.8)  k/uL


 


Sodium  121 L  121 L  121 L  (137-145)  mmol/L


 


Chloride     ()  mmol/L


 


Carbon Dioxide     (22-30)  mmol/L


 


BUN     (7-17)  mg/dL


 


Glucose     (74-99)  mg/dL














  08/09/23 08/09/23 Range/Units





  03:32 03:32 


 


Lymphocytes #   0.7 L  (1.0-4.8)  k/uL


 


Sodium  122 L   (137-145)  mmol/L


 


Chloride  96 L   ()  mmol/L


 


Carbon Dioxide  20 L   (22-30)  mmol/L


 


BUN  24 H   (7-17)  mg/dL


 


Glucose  133 H   (74-99)  mg/dL














Assessment and Plan


Plan: 





Assessment:


1.  Symptomatic hyponatremia.  Status post 3% saline.  Hypervolemic.  Sodium 

level 122 this morning.  TSH normal.  Urine osmolality 722.  Urine sodium less 

than 20.  Cortisol level not low.


2.  Seizure.  Possibly from hyponatremia.  Dilantin stopped a week prior to 

admission due to rash.  Now on Keppra.  Neurology following.


3.  Edema.


4.  Mental delay.





Plan:


Maintain 1200 mL fluid restriction.


Maintain IV Lasix 40 mg twice daily.


Samsca 7.5 mg once now.


Repeat sodium level this evening.


Avoid normal saline patient is hypervolemic.


Follow-up PTH related peptide. Dr. Ennis Dr. Gary Gonsalez

## 2023-08-09 NOTE — P.PN
Subjective


Progress Note Date: 08/09/23


Hospital Course: 


77-year-old female with a history of hyperparathyroidism, primary gastric adeno

ma with resection, hypothyroidism, and prior seizure disorder who presented to 

the ER with dizziness and weakness.  While in the ER she had a witnessed 

seizure.  Initial laboratory analysis was remarkable for sodium 110, chloride 

86, carbon dioxide 19, BUN 19, creatinine 0.5.  Patient was started on normal 

saline.  Serum osmolality came back at 247 and urine osmolality of 722.  

Nephrology was consulted and the patient was started on 3% saline.  She had 

serial electrolytes ordered and she was admitted to the ICU. Per PCP , blood 

work recently on 8/1/23 and her sodium was 132, serum ionized calcium was 5.5.  

She started having significant rash on last month which has been refractory to 2

different oral steroids as well as topical steroids.  She was seen by 

dermatology and had the rash biopsied, but he has not been made aware of the 

biopsy results.  He also notes that he discontinued her Dilantin due to her 

having multiple subtherapeutic levels, no history of seizures in over 20 years, 

Concerned that Dilantin was related to her dermatitis refractory to steroids.  

He also notes that she is coming due to have a CT abdomen and pelvis that she 

has a history of a gastric adenoma that was resected at [facility] and has had a

CT of the past that demonstrated lymphadenopathy in the epigastric region.  He 

also reports that she was diagnosed with hyperparathyroidism around 11 months 

ago and has had a parathyroid uptake which was negative for adenoma.  Sodium 

improving with IV Lasix.  Patient being transferred to the ICU.














Subjective: 


Patient seen and examined at bedside.  No acute events overnight.  Denies any 

new complaints.  Has a Trujillo catheter in place.





Pertinent positives and negatives as discussed above, a complete review of 

systems was performed and all other systems are negative.








Vitals Signs Reviewed. 





General: nontoxic, no distress, appears at stated age


Gen.: Generalized macular rash over her bilateral shoulder areas, chest, and 

left lower abdomen and left thigh without desquamation, scale, Crust, or 

purulent drainage.


Cardiovascular: S1S2 reg, no murmur, positive posterior tibial pulse bilateral, 


Lungs: Decreased breath sounds bilateral, no rhonchi, no rales , no accessory 

muscle use


Abdominal: soft,  nontender to palpation, no guarding, no appreciable 

organomegaly


Ext: no gross muscle atrophy, 2+ edema b/l lower extremities, no contractures


Neuro:  CN II-XI grossly intact, no focal neuro deficits


Psych: Alert, oriented, appropriate affect 





Data Reviewed Today: 


Pertinent Labs: WBC 7.6, hemoglobin 12.1, sodium 122, potassium 4.7, creatinine 

0.59


Imaging: No new imaging





Assessment and Plan:


Severe hyponatremia, likely hypovolaemic


Seizure with history of seizures


-Seizure Seizure precautions


-Nephrology following


-Serial sodium level


-Sodium level slowly improving


-Status post 3% saline


-ICU note reviewed, transfer out of ICU


-Continue IV Lasix 40 twice a day


-Neurology following, continue IV Keppra thousand twice a day


-CT head did not show any acute process


-EEG did not show any epileptiform activity





Dermatitis, improving


-Possibly related to Dilantin


-Could also be infectious in nature, on doxycycline 100 BID


-outpatient biopsy results pending


-on hydrocortisone cream topical, and calamine lotion





History of gastric adenoma now with recurrent lymphadenopathy on prior CT


-Retroperitoneal adenopathy persistent





Hyperparathyroidism


-Nephrology recommendations


-Patient was on Sensipar at home


-PTH RP level pending





Hypothyroidism


-Synthroid


-TSH within normal





Morbid obesity BMI 48.5


-Outpatient structured weight loss








DVT ppx: Heparin subcu





Code status: Full code





Anticipated discharge place: Pending clinical course


Anticipated discharge time: Pending clinical course





Objective





- Vital Signs


Vital signs: 


                                   Vital Signs











Temp  98.1 F   08/09/23 08:00


 


Pulse  102 H  08/09/23 10:00


 


Resp  21   08/09/23 10:00


 


BP  118/55   08/09/23 10:00


 


Pulse Ox  94 L  08/09/23 10:00


 


FiO2      








                                 Intake & Output











 08/08/23 08/09/23 08/09/23





 18:59 06:59 18:59


 


Intake Total 730  650


 


Output Total 1225 1410 425


 


Balance -495 -1410 225


 


Weight  129.6 kg 


 


Intake:   


 


  Oral 730  650


 


Output:   


 


  Urine 1225 1410 425


 


Other:   


 


  Voiding Method Indwelling Catheter Indwelling Catheter Indwelling Catheter


 


  # Bowel Movements 1  














- Labs


CBC & Chem 7: 


                                 08/09/23 03:32





                                 08/09/23 03:32


Labs: 


                  Abnormal Lab Results - Last 24 Hours (Table)











  08/08/23 08/08/23 08/09/23 Range/Units





  15:36 20:05 00:07 


 


Lymphocytes #     (1.0-4.8)  k/uL


 


Sodium  121 L  121 L  121 L  (137-145)  mmol/L


 


Chloride     ()  mmol/L


 


Carbon Dioxide     (22-30)  mmol/L


 


BUN     (7-17)  mg/dL


 


Glucose     (74-99)  mg/dL














  08/09/23 08/09/23 Range/Units





  03:32 03:32 


 


Lymphocytes #   0.7 L  (1.0-4.8)  k/uL


 


Sodium  122 L   (137-145)  mmol/L


 


Chloride  96 L   ()  mmol/L


 


Carbon Dioxide  20 L   (22-30)  mmol/L


 


BUN  24 H   (7-17)  mg/dL


 


Glucose  133 H   (74-99)  mg/dL

## 2023-08-09 NOTE — P.PN
Subjective


Progress Note Date: 08/09/23


Principal diagnosis: 





Acute hyponatremia and breakthrough seizure





I am seeing this patient in new consultation today and 08/07/2023 for 

symptomatic hyponatremia.  Patient is a 77-year-old white female past medical 

history significant for seizure disorder, hypothyroidism, and morbid obesity.  

The patient is a questionable historian.  Apparently, the patient presented to 

the emergency room yesterday afternoon with severe generalized weakness and fall

while in the bathroom.  She denies hitting her head.  There is no obvious head 

trauma.  On arrival to the emergency room, the patient's sodium was found to be 

severely low at 110. She denies starting any new medications, malignancies. She 

did reportedly have a seizure while emergency room, which was not described and 

I'm uncertain of timeframe.  It does appear that it was terminated with 2 mg of 

Ativan.  The patient was loaded with Keppra.  This is likely due to the 

patient's hyponatremia, however, the patient's Dilantin was reportedly stopped 

7-10 days ago.  The patient did develop a rash on her chest and bilateral arms. 

This may have developed between 1-2 weeks ago; as stated before, the patient is 

a poor historian. No reported recent seizure in over 10 years.  Phenytoin is 

known to cause Jackson-Marlon syndrome, however, this is felt to be less likely

the patient has chronically been on this medication.  The rash is localized to 

her chest, upper back, and bilateral arms.  It spares mucosal membranes.  Rash i

s erythemic with yellow crust.  Patient describes the rash as pruritic and 

painful. No fever. Patient has been started on doxycycline.  CBC on arrival is 

unremarkable.  No leukocytosis. BMP on arrival showed a sodium of 110, potassium

4.7, chloride 86, serum bicarb 19, BUN 19, creatinine 0.5, glucose 156.  Serum 

osmolality was low at 247.  Urine osmolality was 722.  Most recent sodium was 

112.  TSH and cortisol levels were within range.  Hypertonic 3% saline is 

infusing at 30 MLS per hour per nephrology.  Patient is edematous.  There is a 

Trujillo catheter with adequate urine production. No further seizure activity noted

by nursing staff.  Patient is currently sitting up in bed, on room air, in no 

acute distress.  She is drowsy but oriented.  She is unable to provide specific 

dates or times.  She has nonfocal generalized weakness throughout. Vital signs 

are stable.





Today on 8/8/2023, doing well, no further episodes of witnessed seizures, sodium

is up to 118 today, patient is not receiving any IV fluid at this point, does 

not seem to be in any distress, she is doing great.  She is off the hypertonic 

saline, her sodium has been fluctuating between 116 and 118 this morning.  Hence

I will recommend a slow infusion of 0.9 normal saline, planning to get her 

sodium up to 123 in the next 12 hours, and hopefully we can transfer the patient

out of the ICU to a regular medical floor.  Patient is relatively asymptomatic, 

her WBC count 6.9 hemoglobin is 11.9.  Electrolytes are relatively unremarkable 

except for a sodium of 118





Patient was reevaluated today on 8/9/2023, doing quite well, asymptomatic, her 

sodium is up to 122, still receiving diuretics by nephrology.  Pulmonary-wise 

the patient is doing great, I will transfer the patient out of the ICU today, 

and I will see the patient as needed.  Nephrology is handling her hyponatremia, 

correction of hyponatremia is extremely slow, expect the patient to be inpatient

for a longer period of time with the lower than expected correction of the 

hyponatremia.  Hence we'll transfer out of the ICU, we'll sign off and see the 

patient as needed.  No active pulmonary issues and no active critical care 

measures at this point.











Objective





- Vital Signs


Vital signs: 


                                   Vital Signs











Temp  98.1 F   08/09/23 08:00


 


Pulse  96   08/09/23 09:00


 


Resp  18   08/09/23 09:00


 


BP  130/60   08/09/23 09:00


 


Pulse Ox  94 L  08/09/23 09:00


 


FiO2      








                                 Intake & Output











 08/08/23 08/09/23 08/09/23





 18:59 06:59 18:59


 


Intake Total 730  650


 


Output Total 1225 1410 145


 


Balance -495 -1410 505


 


Weight  129.6 kg 


 


Intake:   


 


  Oral 730  650


 


Output:   


 


  Urine 1225 1410 145


 


Other:   


 


  Voiding Method Indwelling Catheter Indwelling Catheter 


 


  # Bowel Movements 1  














- Exam





Physical Exam revealed a 77-year-old female obese in no distress, patient is on 

room air.


Head: Atraumatic, normocephalic.


HEENT:[Neck is supple.] [No neck masses.] [No thyromegaly.] [No JVD.]


Chest: [Clear throughout, no crackles, no rhonchi, no wheezes.]


Cardiac Exam: [Normal S1 and S2, no S3 gallop, no murmur.]


Abdomen: [Soft, nontender,  no megaly, no rebound, no guarding, normal bowel 

sounds.]


Extremities: [No clubbing, trace of bipedal edema/lymphedema, no cyanosis.]


Neurological Exam: [No focal neurologic deficit.],  Alert oriented 3.


Skin: Diffuse rash noted throughout the whole body especially the trunk area 

with erythema and yellow crusting





- Labs


CBC & Chem 7: 


                                 08/09/23 03:32





                                 08/09/23 03:32


Labs: 


                  Abnormal Lab Results - Last 24 Hours (Table)











  08/08/23 08/08/23 08/08/23 Range/Units





  09:48 15:36 20:05 


 


Lymphocytes #     (1.0-4.8)  k/uL


 


Sodium  119 L*  121 L  121 L  (137-145)  mmol/L


 


Chloride     ()  mmol/L


 


Carbon Dioxide     (22-30)  mmol/L


 


BUN     (7-17)  mg/dL


 


Glucose     (74-99)  mg/dL














  08/09/23 08/09/23 08/09/23 Range/Units





  00:07 03:32 03:32 


 


Lymphocytes #    0.7 L  (1.0-4.8)  k/uL


 


Sodium  121 L  122 L   (137-145)  mmol/L


 


Chloride   96 L   ()  mmol/L


 


Carbon Dioxide   20 L   (22-30)  mmol/L


 


BUN   24 H   (7-17)  mg/dL


 


Glucose   133 H   (74-99)  mg/dL














Assessment and Plan


Assessment: 





Impression


Acute hypovolemic hyponatremia, this is not consistent with hypervolemic 

hyponatremia mostly because of the urine osmolality is way too high to correlate

with hypervolemia.  I also doubt SIADH, patient did not seem to be euvolemic.  

And again her serum osmolality is way too high to explain this.


Seizure disorder/breakthrough seizure could be exacerbated by the profound 

hyponatremia on presentation.


Bactrim induced dermatitis, being addressed by dermatology.  Patient used to be 

on Dilantin which was placed on hold.


History of hypothyroidism


Morbid obesity BMI of 49








Recommendation:


Transfer patient to a regular medical floor


Continue seizure precautions


Nephrology to continue to address her hyponatremia


Continue seizure precautions and seizure medications, this is being addressed by

neurology


Continue cortisone cream for the rash


Continue DVT prophylaxis and GI prophylaxis


We will sign off for now


We'll see the patient on when necessary base


Time with Patient: Less than 30

## 2023-08-10 LAB
ANION GAP SERPL CALC-SCNC: 4 MMOL/L
ANION GAP SERPL CALC-SCNC: 6.3 MMOL/L (ref 4–12)
BUN SERPL-SCNC: 22 MG/DL (ref 9–27)
BUN SERPL-SCNC: 28 MG/DL (ref 7–17)
BUN/CREAT SERPL: 36.67 RATIO (ref 12–20)
CALCIUM SPEC-MCNC: 9.2 MG/DL (ref 8.4–10.2)
CALCIUM SPEC-MCNC: 9.5 MG/DL (ref 8.7–10.3)
CHLORIDE SERPL-SCNC: 95 MMOL/L (ref 96–109)
CHLORIDE SERPL-SCNC: 98 MMOL/L (ref 98–107)
CO2 SERPL-SCNC: 24 MMOL/L (ref 22–30)
CO2 SERPL-SCNC: 24.7 MMOL/L (ref 21.6–31.8)
GLUCOSE SERPL-MCNC: 125 MG/DL (ref 70–110)
GLUCOSE SERPL-MCNC: 188 MG/DL (ref 74–99)
MAGNESIUM SPEC-SCNC: 2.2 MG/DL (ref 1.5–2.4)
POTASSIUM SERPL-SCNC: 4.7 MMOL/L (ref 3.5–5.1)
POTASSIUM SERPL-SCNC: 6.3 MMOL/L (ref 3.5–5.5)
SODIUM SERPL-SCNC: 126 MMOL/L (ref 135–145)
SODIUM SERPL-SCNC: 126 MMOL/L (ref 137–145)

## 2023-08-10 RX ADMIN — HYDROCORTISONE SCH APPLIC: 1 CREAM TOPICAL at 09:36

## 2023-08-10 RX ADMIN — HEPARIN SODIUM SCH UNIT: 5000 INJECTION, SOLUTION INTRAVENOUS; SUBCUTANEOUS at 09:35

## 2023-08-10 RX ADMIN — DOXYCYCLINE SCH MG: 100 CAPSULE ORAL at 20:21

## 2023-08-10 RX ADMIN — HEPARIN SODIUM SCH UNIT: 5000 INJECTION, SOLUTION INTRAVENOUS; SUBCUTANEOUS at 17:02

## 2023-08-10 RX ADMIN — LEVOTHYROXINE SODIUM SCH MCG: 0.12 TABLET ORAL at 06:05

## 2023-08-10 RX ADMIN — HYDROCORTISONE SCH APPLIC: 1 CREAM TOPICAL at 20:22

## 2023-08-10 RX ADMIN — LEVETIRACETAM SCH MG: 100 INJECTION, SOLUTION, CONCENTRATE INTRAVENOUS at 20:22

## 2023-08-10 RX ADMIN — DOXYCYCLINE SCH MG: 100 CAPSULE ORAL at 09:36

## 2023-08-10 RX ADMIN — FUROSEMIDE SCH MG: 10 INJECTION, SOLUTION INTRAMUSCULAR; INTRAVENOUS at 20:22

## 2023-08-10 RX ADMIN — FUROSEMIDE SCH MG: 10 INJECTION, SOLUTION INTRAMUSCULAR; INTRAVENOUS at 09:35

## 2023-08-10 RX ADMIN — LEVETIRACETAM SCH MG: 100 INJECTION, SOLUTION, CONCENTRATE INTRAVENOUS at 09:50

## 2023-08-10 RX ADMIN — HEPARIN SODIUM SCH UNIT: 5000 INJECTION, SOLUTION INTRAVENOUS; SUBCUTANEOUS at 23:46

## 2023-08-10 RX ADMIN — PANTOPRAZOLE SODIUM SCH MG: 40 INJECTION, POWDER, FOR SOLUTION INTRAVENOUS at 09:35

## 2023-08-10 NOTE — P.PN
Subjective


Progress Note Date: 08/10/23


Hospital Course: 


77-year-old female with a history of hyperparathyroidism, primary gastric adeno

ma with resection, hypothyroidism, and prior seizure disorder who presented to 

the ER with dizziness and weakness.  While in the ER she had a witnessed 

seizure.  Initial laboratory analysis was remarkable for sodium 110, chloride 

86, carbon dioxide 19, BUN 19, creatinine 0.5.  Patient was started on normal 

saline.  Serum osmolality came back at 247 and urine osmolality of 722.  

Nephrology was consulted and the patient was started on 3% saline.  She had 

serial electrolytes ordered and she was admitted to the ICU. Per PCP , blood 

work recently on 8/1/23 and her sodium was 132, serum ionized calcium was 5.5.  

She started having significant rash on last month which has been refractory to 2

different oral steroids as well as topical steroids.  She was seen by 

dermatology and had the rash biopsied, but he has not been made aware of the 

biopsy results.  He also notes that he discontinued her Dilantin due to her 

having multiple subtherapeutic levels, no history of seizures in over 20 years, 

Concerned that Dilantin was related to her dermatitis refractory to steroids.  

He also notes that she is coming due to have a CT abdomen and pelvis that she 

has a history of a gastric adenoma that was resected at [facility] and has had a

CT of the past that demonstrated lymphadenopathy in the epigastric region.  He 

also reports that she was diagnosed with hyperparathyroidism around 11 months 

ago and has had a parathyroid uptake which was negative for adenoma.  Sodium 

improving with IV Lasix.  Patient being transferred to the ICU. Samsca x1. Fluid

restriction. 














Subjective: 


Patient seen and examined at bedside.  No acute events overnight.  Denies any 

new complaints.  Has a Trujillo catheter in place.





Pertinent positives and negatives as discussed above, a complete review of 

systems was performed and all other systems are negative.








Vitals Signs Reviewed. 





General: nontoxic, no distress, appears at stated age


Gen.: Generalized macular rash over her bilateral shoulder areas, chest, and 

left lower abdomen and left thigh without desquamation, scale, Crust, or 

purulent drainage.


Cardiovascular: S1S2 reg, no murmur, positive posterior tibial pulse bilateral, 


Lungs: Decreased breath sounds bilateral, no rhonchi, no rales , no accessory 

muscle use


Abdominal: soft,  nontender to palpation, no guarding, no appreciable 

organomegaly


Ext: no gross muscle atrophy, 2+ edema b/l lower extremities, no contractures


Neuro:  CN II-XI grossly intact, no focal neuro deficits


Psych: Alert, oriented, appropriate affect 





Data Reviewed Today: 


Pertinent Labs: Sodium 126, potassium 6.3, hemolyzed, creatinine 0.6, magnesium 

2.2


Imaging: No new imaging





Assessment and Plan:


Severe hyponatremia, likely hypervolemic


Breakthrough Seizure with history of epilepsy


Hyperkalemia, specimen hemolyzed


-Seizure precautions


-Nephrology note reviewed, Samsca 1, fluid restriction, continue IV Lasix 40 

twice a day


-Serial sodium level


-Sodium level slowly improving


-Status post 3% saline


-Repeat potassium


-Neurology following, continue IV Keppra thousand twice a day





Dermatitis, improving


-Possibly related to Dilantin


-Could also be infectious in nature, on doxycycline 100 BID


-outpatient biopsy results pending


-on hydrocortisone cream topical, and calamine lotion





History of gastric adenoma now with recurrent lymphadenopathy on prior CT


-Retroperitoneal adenopathy persistent





Hyperparathyroidism


-Nephrology recommendations


-Patient was on Sensipar at home


-PTH RP level pending





Hypothyroidism


-Synthroid


-TSH within normal





Morbid obesity BMI 48.5


-Outpatient structured weight loss








DVT ppx: Heparin subcu





Code status: Full code





Anticipated discharge place: Pending clinical course


Anticipated discharge time: Pending clinical course





Objective





- Vital Signs


Vital signs: 


                                   Vital Signs











Temp  98.0 F   08/10/23 07:18


 


Pulse  103 H  08/10/23 07:18


 


Resp  22   08/10/23 07:18


 


BP  113/58   08/10/23 07:18


 


Pulse Ox  97   08/10/23 07:18


 


FiO2      








                                 Intake & Output











 08/09/23 08/10/23 08/10/23





 18:59 06:59 18:59


 


Intake Total 1190 2027 


 


Output Total 1325 1200 


 


Balance -135 827 


 


Weight  126.5 kg 


 


Intake:   


 


  Oral 1190 2027 


 


Output:   


 


  Urine 1325 1200 


 


Other:   


 


  Voiding Method Indwelling Catheter Indwelling Catheter 














- Labs


CBC & Chem 7: 


                                 08/09/23 03:32





                                 08/10/23 06:52


Labs: 


                  Abnormal Lab Results - Last 24 Hours (Table)











  08/09/23 08/10/23 Range/Units





  16:36 06:52 


 


Sodium  126 L  126 L  (137-145)  mmol/L


 


Potassium   6.3 H*  (3.5-5.5)  mmol/L


 


Chloride   95 L  ()  mmol/L


 


BUN/Creatinine Ratio   36.67 H  (12.00-20.00)  Ratio


 


Glucose   125 H  ()  mg/dL

## 2023-08-10 NOTE — P.PN
Subjective





Patient is seen in follow-up for hyponatremia.  Sodium level up to 126 this 

morning.  Maintained on IV Lasix.  Nonoliguric.  Oral intake is good.  On fluid 

restriction. Hemodynamically stable.  Sitting up in chair.  Potassium high but 

it is a hemolyzed sample.





Vital signs are stable.


General: No acute distress.


HEENT: Head exam is unremarkable. 


LUNGS: No audible rhonchi or wheezes.


HEART: Rate and Rhythm are regular.


ABDOMEN: Soft, obese.


EXTREMITITES: 2+ edema.





Objective





- Vital Signs


Vital signs: 


                                   Vital Signs











Temp  98.0 F   08/10/23 07:18


 


Pulse  103 H  08/10/23 07:18


 


Resp  22   08/10/23 07:18


 


BP  113/58   08/10/23 07:18


 


Pulse Ox  97   08/10/23 07:18


 


FiO2      








                                 Intake & Output











 08/09/23 08/10/23 08/10/23





 18:59 06:59 18:59


 


Intake Total 1190 2027 


 


Output Total 1325 1200 


 


Balance -135 827 


 


Weight  126.5 kg 


 


Intake:   


 


  Oral 1190 2027 


 


Output:   


 


  Urine 1325 1200 


 


Other:   


 


  Voiding Method Indwelling Catheter Indwelling Catheter 














- Labs


CBC & Chem 7: 


                                 08/09/23 03:32





                                 08/10/23 06:52


Labs: 


                  Abnormal Lab Results - Last 24 Hours (Table)











  08/09/23 08/09/23 08/10/23 Range/Units





  11:55 16:36 06:52 


 


Sodium  123 L  126 L  126 L  (137-145)  mmol/L


 


Potassium    6.3 H*  (3.5-5.5)  mmol/L


 


Chloride    95 L  ()  mmol/L


 


BUN/Creatinine Ratio    36.67 H  (12.00-20.00)  Ratio


 


Glucose    125 H  ()  mg/dL














Assessment and Plan


Plan: 





Assessment:


1.  Symptomatic hyponatremia.  Status post 3% saline.  Hypervolemic.  Sodium 

level 126 this morning.  TSH normal.  Urine osmolality 722.  Urine sodium less 

than 20.  Cortisol level not low.


2.  Seizure.  Possibly from hyponatremia.  Dilantin stopped a week prior to 

admission due to rash.  Now on Keppra.  Neurology following.


3.  Edema.  Improving with diuresis.


4.  Mental delay.


5.  Hyperkalemia.  Hemolyzed sample.  This will be repeated.





Plan:


Maintain 1200 mL fluid restriction.


Maintain IV Lasix 40 mg twice daily.


Samsca 7.5 mg once today.


Repeat potassium level now.


Follow-up PTH related peptide.

## 2023-08-10 NOTE — P.PN
Subjective


Progress Note Date: 08/09/23





Patient was seen for a follow-up.  Patient is laying comfortably in the bed.  

States rashes getting better.  No further seizures.  Patient offers no 

complaints.





Objective





- Vital Signs


Vital signs: 


                                   Vital Signs











Temp  98.7 F   08/09/23 20:00


 


Pulse  96   08/09/23 20:00


 


Resp  18   08/09/23 20:00


 


BP  121/61   08/09/23 20:00


 


Pulse Ox  98   08/09/23 20:00


 


FiO2      








                                 Intake & Output











 08/09/23 08/09/23 08/10/23





 06:59 18:59 06:59


 


Intake Total  1190 


 


Output Total 1410 1325 100


 


Balance -1410 -135 -100


 


Weight 129.6 kg  


 


Intake:   


 


  Oral  1190 


 


Output:   


 


  Urine 1410 1325 100


 


Other:   


 


  Voiding Method Indwelling Catheter Indwelling Catheter Indwelling Catheter














- Exam





Patient is alert and awake in no distress.  Speech and language functions are 

normal.  Rash is better.  Detailed testing deferred.





- Labs


CBC & Chem 7: 


                                 08/09/23 03:32





                                 08/09/23 16:36


Labs: 


                  Abnormal Lab Results - Last 24 Hours (Table)











  08/08/23 08/09/23 08/09/23 Range/Units





  20:05 00:07 03:32 


 


Lymphocytes #     (1.0-4.8)  k/uL


 


Sodium  121 L  121 L  122 L  (137-145)  mmol/L


 


Chloride    96 L  ()  mmol/L


 


Carbon Dioxide    20 L  (22-30)  mmol/L


 


BUN    24 H  (7-17)  mg/dL


 


Glucose    133 H  (74-99)  mg/dL














  08/09/23 08/09/23 08/09/23 Range/Units





  03:32 11:55 16:36 


 


Lymphocytes #  0.7 L    (1.0-4.8)  k/uL


 


Sodium   123 L  126 L  (137-145)  mmol/L


 


Chloride     ()  mmol/L


 


Carbon Dioxide     (22-30)  mmol/L


 


BUN     (7-17)  mg/dL


 


Glucose     (74-99)  mg/dL














Assessment and Plan


Assessment: 





* Syncope versus seizure, probably provoked due to hyponatremia.


* Seizure disorder, off Dilantin for last 7 days may have triggered breakthrough

  seizure as well.


* Dilantin rash, severe.  Per dermatologist, does not have Jackson-Marlon's.


* Hyponatremia


* Obesity


* Fluid retention.


* Developmental delays, mild











Plan: 





* Agree with discontinuing Dilantin because of severe rash.  Rash has improved 

  since being off Dilantin.


* Patient started on Keppra 1000 mg twice a day.  Patient is tolerating it well 

  so far.  Watch for behavioral issues related to Keppra.  In that case may have

  to switch to a different medication.


* EEG performed 08/07/2023 was borderline abnormal because of presence of 

  excessive amount of low voltage fast frequency beta activity suggestive of 

  medication effect.  No epileptiform activity was seen.  


* Hyponatremia being managed by nephrology.  Most recent sodium 126.


* Patient has not had any more seizures since started on Keppra.  


* Other medical management as per IM and other specialties.

## 2023-08-11 LAB
ANION GAP SERPL CALC-SCNC: 2 MMOL/L
BUN SERPL-SCNC: 33 MG/DL (ref 7–17)
CALCIUM SPEC-MCNC: 9.6 MG/DL (ref 8.4–10.2)
CHLORIDE SERPL-SCNC: 98 MMOL/L (ref 98–107)
CO2 SERPL-SCNC: 28 MMOL/L (ref 22–30)
GLUCOSE SERPL-MCNC: 131 MG/DL (ref 74–99)
MAGNESIUM SPEC-SCNC: 2.3 MG/DL (ref 1.6–2.3)
POTASSIUM SERPL-SCNC: 5 MMOL/L (ref 3.5–5.1)
SODIUM SERPL-SCNC: 128 MMOL/L (ref 137–145)

## 2023-08-11 RX ADMIN — LEVETIRACETAM SCH MG: 100 INJECTION, SOLUTION, CONCENTRATE INTRAVENOUS at 10:05

## 2023-08-11 RX ADMIN — DOXYCYCLINE SCH MG: 100 CAPSULE ORAL at 10:05

## 2023-08-11 RX ADMIN — FUROSEMIDE SCH MG: 10 INJECTION, SOLUTION INTRAMUSCULAR; INTRAVENOUS at 10:06

## 2023-08-11 RX ADMIN — LEVETIRACETAM SCH MG: 100 INJECTION, SOLUTION, CONCENTRATE INTRAVENOUS at 20:09

## 2023-08-11 RX ADMIN — HYDROCORTISONE SCH APPLIC: 1 CREAM TOPICAL at 20:09

## 2023-08-11 RX ADMIN — HEPARIN SODIUM SCH UNIT: 5000 INJECTION, SOLUTION INTRAVENOUS; SUBCUTANEOUS at 10:06

## 2023-08-11 RX ADMIN — DOXYCYCLINE SCH MG: 100 CAPSULE ORAL at 20:09

## 2023-08-11 RX ADMIN — PANTOPRAZOLE SODIUM SCH MG: 40 INJECTION, POWDER, FOR SOLUTION INTRAVENOUS at 10:06

## 2023-08-11 RX ADMIN — FUROSEMIDE SCH MG: 10 INJECTION, SOLUTION INTRAMUSCULAR; INTRAVENOUS at 20:09

## 2023-08-11 RX ADMIN — LEVOTHYROXINE SODIUM SCH MCG: 0.12 TABLET ORAL at 06:13

## 2023-08-11 RX ADMIN — FERRIC OXIDE RED PRN APPLIC: 8; 8 LOTION TOPICAL at 08:01

## 2023-08-11 RX ADMIN — HYDROCORTISONE SCH APPLIC: 1 CREAM TOPICAL at 10:06

## 2023-08-11 RX ADMIN — HEPARIN SODIUM SCH UNIT: 5000 INJECTION, SOLUTION INTRAVENOUS; SUBCUTANEOUS at 20:09

## 2023-08-11 RX ADMIN — HEPARIN SODIUM SCH UNIT: 5000 INJECTION, SOLUTION INTRAVENOUS; SUBCUTANEOUS at 16:52

## 2023-08-11 NOTE — P.PN
Subjective





Patient is seen in follow-up for hyponatremia.  Sodium level up to 128 this 

morning.  Maintained on IV Lasix.  Received dose of Samsca yesterday.  N

onoliguric.  Oral intake is good.  On fluid restriction. Hemodynamically stable.







Vital signs are stable.


General: No acute distress.


HEENT: Head exam is unremarkable. 


LUNGS: No audible rhonchi or wheezes.


HEART: Rate and Rhythm are regular.


ABDOMEN: Soft, obese.


EXTREMITITES: 2+ edema.





Objective





- Vital Signs


Vital signs: 


                                   Vital Signs











Temp  98.2 F   08/11/23 07:14


 


Pulse  91   08/11/23 07:14


 


Resp  20   08/11/23 07:14


 


BP  148/73   08/11/23 07:14


 


Pulse Ox  94 L  08/11/23 07:14


 


FiO2      








                                 Intake & Output











 08/10/23 08/11/23 08/11/23





 18:59 06:59 18:59


 


Output Total 600 1200 


 


Balance -600 -1200 


 


Weight  127.5 kg 


 


Output:   


 


  Urine 600 1200 


 


Other:   


 


  Voiding Method Indwelling Catheter Indwelling Catheter 


 


  # Voids  2 


 


  # Bowel Movements 1 1 














- Labs


CBC & Chem 7: 


                                 08/09/23 03:32





                                 08/11/23 06:49


Labs: 


                  Abnormal Lab Results - Last 24 Hours (Table)











  08/10/23 08/11/23 Range/Units





  13:22 06:49 


 


Sodium  126 L  128 L  (137-145)  mmol/L


 


BUN  28 H  33 H  (7-17)  mg/dL


 


Glucose  188 H  131 H  (74-99)  mg/dL














Assessment and Plan


Plan: 





Assessment:


1.  Symptomatic hyponatremia.  Status post 3% saline.  Hypervolemic.  Sodium 

level 128 this morning.  TSH normal.  Urine osmolality 722.  Urine sodium less 

than 20.  Cortisol level not low.


2.  Seizure.  Possibly from hyponatremia.  Dilantin stopped a week prior to 

admission due to rash.  Now on Keppra.  Neurology following.


3.  Edema.  Improving with diuresis.


4.  Mental delay.


5.  Hyperkalemia.  Hemolyzed sample.  Repeat potassium normal. 





Plan:


Maintain 1200 mL fluid restriction.


Maintain IV Lasix 40 mg twice daily.


Repeat Samsca 7.5 mg once today.


Follow-up PTH related peptide.

## 2023-08-11 NOTE — P.PN
Subjective


Progress Note Date: 08/11/23





Hospital Course: 


77-year-old female with a history of hyperparathyroidism, primary gastric kiran

eleni with resection, hypothyroidism, and prior seizure disorder who presented to 

the ER with dizziness and weakness.  While in the ER she had a witnessed 

seizure.  Initial laboratory analysis was remarkable for sodium 110, chloride 

86, carbon dioxide 19, BUN 19, creatinine 0.5.  Patient was started on normal 

saline.  Serum osmolality came back at 247 and urine osmolality of 722.  

Nephrology was consulted and the patient was started on 3% saline.  She had 

serial electrolytes ordered and she was admitted to the ICU. Per PCP , blood 

work recently on 8/1/23 and her sodium was 132, serum ionized calcium was 5.5.  

She started having significant rash on last month which has been refractory to 2

different oral steroids as well as topical steroids.  She was seen by 

dermatology and had the rash biopsied, but he has not been made aware of the 

biopsy results.  He also notes that he discontinued her Dilantin due to her 

having multiple subtherapeutic levels, no history of seizures in over 20 years, 

Concerned that Dilantin was related to her dermatitis refractory to steroids.  

He also notes that she is coming due to have a CT abdomen and pelvis that she 

has a history of a gastric adenoma that was resected at [facility] and has had a

CT of the past that demonstrated lymphadenopathy in the epigastric region.  He 

also reports that she was diagnosed with hyperparathyroidism around 11 months 

ago and has had a parathyroid uptake which was negative for adenoma.  Sodium 

improving with IV Lasix.  Patient out of the ICU. Samsca x2. Fluid restriction. 














Subjective: 


Patient seen and examined at bedside.  No acute events overnight.  Denies any 

new complaints.  Has a Trujillo catheter in place.





Pertinent positives and negatives as discussed above, a complete review of 

systems was performed and all other systems are negative.








Vitals Signs Reviewed. 





General: nontoxic, no distress, appears at stated age


Gen.: Generalized macular rash over her bilateral shoulder areas, chest, and 

left lower abdomen and left thigh without desquamation, scale, Crust, or 

purulent drainage.


Cardiovascular: S1S2 reg, no murmur, positive posterior tibial pulse bilateral, 


Lungs: Decreased breath sounds bilateral, no rhonchi, no rales , no accessory 

muscle use


Abdominal: soft,  nontender to palpation, no guarding, no appreciable 

organomegaly


Ext: no gross muscle atrophy, 2+ edema b/l lower extremities, no contractures


Neuro:  CN II-XI grossly intact, no focal neuro deficits


Psych: Alert, oriented, appropriate affect 





Data Reviewed Today: 


Pertinent Labs: Sodium 128, potassium 5, creatinine 0.62, magnesium 2.3


Imaging: No new imaging





Assessment and Plan:


Severe hyponatremia, likely hypervolemic


Breakthrough Seizure with history of epilepsy


-Seizure precautions


-Nephrology note reviewed, repeat Samsca 1, fluid restriction, continue IV 

Lasix 40 twice a day


-Serial sodium level


-Sodium level slowly improving


-Status post 3% saline


-Neurology following, continue IV Keppra thousand twice a day





Dermatitis, improving


-Possibly related to Dilantin


-Could also be infectious in nature, on doxycycline 100 BID


-outpatient biopsy results pending


-on hydrocortisone cream topical, and calamine lotion





History of gastric adenoma now with recurrent lymphadenopathy on prior CT


-Retroperitoneal adenopathy persistent





Hyperparathyroidism


-Nephrology recommendations


-Patient was on Sensipar at home


-PTH RP level pending





Hypothyroidism


-Synthroid


-TSH within normal





Morbid obesity BMI 48.5


-Outpatient structured weight loss








DVT ppx: Heparin subcu





Code status: Full code





Anticipated discharge place: Pending clinical course


Anticipated discharge time: Pending clinical course





Objective





- Vital Signs


Vital signs: 


                                   Vital Signs











Temp  98.2 F   08/11/23 07:14


 


Pulse  91   08/11/23 07:14


 


Resp  20   08/11/23 07:14


 


BP  148/73   08/11/23 07:14


 


Pulse Ox  94 L  08/11/23 07:14


 


FiO2      








                                 Intake & Output











 08/10/23 08/11/23 08/11/23





 18:59 06:59 18:59


 


Output Total 600 1200 


 


Balance -600 -1200 


 


Weight  127.5 kg 


 


Output:   


 


  Urine 600 1200 


 


Other:   


 


  Voiding Method Indwelling Catheter Indwelling Catheter 


 


  # Voids  2 


 


  # Bowel Movements 1 1 














- Labs


CBC & Chem 7: 


                                 08/09/23 03:32





                                 08/11/23 06:49


Labs: 


                  Abnormal Lab Results - Last 24 Hours (Table)











  08/11/23 Range/Units





  06:49 


 


Sodium  128 L  (137-145)  mmol/L


 


BUN  33 H  (7-17)  mg/dL


 


Glucose  131 H  (74-99)  mg/dL

## 2023-08-12 LAB
ANION GAP SERPL CALC-SCNC: 4 MMOL/L
BUN SERPL-SCNC: 40 MG/DL (ref 7–17)
CALCIUM SPEC-MCNC: 9.9 MG/DL (ref 8.4–10.2)
CHLORIDE SERPL-SCNC: 98 MMOL/L (ref 98–107)
CO2 SERPL-SCNC: 28 MMOL/L (ref 22–30)
GLUCOSE SERPL-MCNC: 144 MG/DL (ref 74–99)
MAGNESIUM SPEC-SCNC: 2.3 MG/DL (ref 1.6–2.3)
POTASSIUM SERPL-SCNC: 5 MMOL/L (ref 3.5–5.1)
SODIUM SERPL-SCNC: 130 MMOL/L (ref 137–145)

## 2023-08-12 RX ADMIN — PANTOPRAZOLE SODIUM SCH MG: 40 INJECTION, POWDER, FOR SOLUTION INTRAVENOUS at 08:50

## 2023-08-12 RX ADMIN — FERRIC OXIDE RED PRN APPLIC: 8; 8 LOTION TOPICAL at 21:15

## 2023-08-12 RX ADMIN — HEPARIN SODIUM SCH UNIT: 5000 INJECTION, SOLUTION INTRAVENOUS; SUBCUTANEOUS at 08:52

## 2023-08-12 RX ADMIN — DOXYCYCLINE SCH MG: 100 CAPSULE ORAL at 21:14

## 2023-08-12 RX ADMIN — FUROSEMIDE SCH MG: 10 INJECTION, SOLUTION INTRAMUSCULAR; INTRAVENOUS at 21:15

## 2023-08-12 RX ADMIN — HYDROCORTISONE SCH APPLIC: 1 CREAM TOPICAL at 11:53

## 2023-08-12 RX ADMIN — HEPARIN SODIUM SCH UNIT: 5000 INJECTION, SOLUTION INTRAVENOUS; SUBCUTANEOUS at 16:28

## 2023-08-12 RX ADMIN — DOXYCYCLINE SCH MG: 100 CAPSULE ORAL at 08:50

## 2023-08-12 RX ADMIN — LEVOTHYROXINE SODIUM SCH MCG: 0.12 TABLET ORAL at 05:04

## 2023-08-12 RX ADMIN — LEVETIRACETAM SCH MG: 100 INJECTION, SOLUTION, CONCENTRATE INTRAVENOUS at 21:15

## 2023-08-12 RX ADMIN — HYDROCORTISONE SCH APPLIC: 1 CREAM TOPICAL at 21:15

## 2023-08-12 RX ADMIN — FUROSEMIDE SCH MG: 10 INJECTION, SOLUTION INTRAMUSCULAR; INTRAVENOUS at 08:50

## 2023-08-12 RX ADMIN — LEVETIRACETAM SCH MG: 100 INJECTION, SOLUTION, CONCENTRATE INTRAVENOUS at 08:52

## 2023-08-12 NOTE — P.PN
Subjective


Progress Note Date: 08/10/23





Patient was seen for a follow-up.  Patient is sitting comfortably in the 

recliner.  States rashes getting better.  No further seizures.  Patient offers 

no complaints.





Objective





- Vital Signs


Vital signs: 


                                   Vital Signs











Temp  98.3 F   08/12/23 01:00


 


Pulse  93   08/12/23 01:00


 


Resp  18   08/12/23 01:00


 


BP  141/61   08/12/23 01:00


 


Pulse Ox  97   08/12/23 01:00


 


FiO2      








                                 Intake & Output











 08/11/23 08/11/23 08/12/23





 06:59 18:59 06:59


 


Output Total 1200 1300 


 


Balance -1200 -1300 


 


Weight 127.5 kg 127.5 kg 


 


Output:   


 


  Urine 1200 1300 


 


Other:   


 


  Voiding Method Indwelling Catheter  


 


  # Voids 2  


 


  # Bowel Movements 1  














- Exam





Patient is alert and awake in no distress.  Speech and language functions are 

normal.  Rash is better.  Detailed testing deferred.  Patient has mild exotropia

of the right eye.





- Labs


CBC & Chem 7: 


                                 08/09/23 03:32





                                 08/11/23 06:49


Labs: 


                  Abnormal Lab Results - Last 24 Hours (Table)











  08/11/23 Range/Units





  06:49 


 


Sodium  128 L  (137-145)  mmol/L


 


BUN  33 H  (7-17)  mg/dL


 


Glucose  131 H  (74-99)  mg/dL














Assessment and Plan


Assessment: 





* Syncope versus seizure, probably provoked due to hyponatremia.


* Seizure disorder, off Dilantin for last 7 days may have triggered breakthrough

  seizure as well.


* Dilantin rash, severe.  Per dermatologist, does not have Jackson-Marlon's.


* Hyponatremia


* Obesity


* Fluid retention.


* Developmental delays, mild











Plan: 





* Agree with discontinuing Dilantin because of severe rash.  Rash has improved 

  since being off Dilantin.


* Patient started on Keppra 1000 mg twice a day.  Patient is tolerating it well 

  so far.  Watch for behavioral issues related to Keppra.  In that case may have

  to switch to a different medication.


* EEG performed 08/07/2023 was borderline abnormal because of presence of 

  excessive amount of low voltage fast frequency beta activity suggestive of 

  medication effect.  No epileptiform activity was seen.  


* Hyponatremia being managed by nephrology.  Most recent sodium 126.


* Patient has not had any more seizures since started on Keppra.  


* Other medical management as per IM and other specialties.  


* Neurology will sign off.  Please reconsult neurology if any concerns.

## 2023-08-12 NOTE — P.PN
Subjective





Patient is seen in follow-up for hyponatremia.  Sodium level up to 130 this 

morning.  Maintained on IV Lasix.  Received a dose of Samsca 2 days ago.  

Nonoliguric.  Maintained on IV Lasix.  Patient has significant bilateral lower 

sternum Mittie edema.


Oral intake is good.  On fluid restriction. Hemodynamically stable. 





Objective





- Vital Signs


Vital signs: 


                                   Vital Signs











Temp  98.4 F   08/12/23 06:54


 


Pulse  108 H  08/12/23 08:52


 


Resp  18   08/12/23 08:52


 


BP  154/67   08/12/23 06:54


 


Pulse Ox  94 L  08/12/23 08:08


 


FiO2      








                                 Intake & Output











 08/11/23 08/12/23 08/12/23





 18:59 06:59 18:59


 


Output Total 1300 576 


 


Balance -1300 -576 


 


Weight 127.5 kg 128 kg 


 


Output:   


 


  Urine 1300  


 


  Post Void Residual  576 


 


Other:   


 


  Voiding Method   Bedpan


 


  # Voids  2 














- Exam





Patient is awake, comfortable, no acute distress


Examination of the heart S1 and S2


Examination of the lungs decreased breath sounds at the bases


Abdomen is soft obese nontender


Examination lower extremity shows bilateral chronic skin changes with bilateral 

chronic edema 2+.


CNS exam shows patient is weak.  She can wiggle her toes but is not moving her 

legs much.





- Labs


CBC & Chem 7: 


                                 08/09/23 03:32





                                 08/12/23 06:37


Labs: 


                  Abnormal Lab Results - Last 24 Hours (Table)











  08/12/23 Range/Units





  06:37 


 


Sodium  130 L  (137-145)  mmol/L


 


BUN  40 H  (7-17)  mg/dL


 


Glucose  144 H  (74-99)  mg/dL














Assessment and Plan


Assessment: 





1.  Symptomatic hyponatremia.  Status post 3% saline.  Status post 1 dose of 

Samsca 2 days ago.  Hypervolemic.  Sodium level 130 this morning.  TSH normal.  

Urine osmolality 722.  Urine sodium less than 20.  Cortisol level not low.  

Maintained on IV Lasix.


2.  Seizure.  Possibly from hyponatremia.  Dilantin stopped a week prior to 

admission due to rash.  Now on Keppra.  Neurology following.


3.  Edema.  Improving with diuresis.


4.  Mental delay.


5.  Hyperkalemia.  Hemolyzed sample.  Repeat potassium normal. 


Plan: 





Continue with IV Lasix


Repeat sodium in a.m.


Continue with fluid restriction.

## 2023-08-12 NOTE — P.PN
Subjective


Progress Note Date: 08/12/23


Hospital Course: 


77-year-old female with a history of hyperparathyroidism, primary gastric 

adenoma with resection, hypothyroidism, and prior seizure disorder who presented

to the ER with dizziness and weakness.  While in the ER she had a witnessed 

seizure.  Initial laboratory analysis was remarkable for sodium 110, chloride 

86, carbon dioxide 19, BUN 19, creatinine 0.5.  Patient was started on normal 

saline.  Serum osmolality came back at 247 and urine osmolality of 722.  

Nephrology was consulted and the patient was started on 3% saline.  She had 

serial electrolytes ordered and she was admitted to the ICU. Per PCP , blood 

work recently on 8/1/23 and her sodium was 132, serum ionized calcium was 5.5.  

She started having significant rash on last month which has been refractory to 2

different oral steroids as well as topical steroids.  She was seen by 

dermatology and had the rash biopsied, but he has not been made aware of the 

biopsy results.  He also notes that he discontinued her Dilantin due to her 

having multiple subtherapeutic levels, no history of seizures in over 20 years, 

Concerned that Dilantin was related to her dermatitis refractory to steroids.  

He also notes that she is coming due to have a CT abdomen and pelvis that she 

has a history of a gastric adenoma that was resected at [facility] and has had a

CT of the past that demonstrated lymphadenopathy in the epigastric region.  He 

also reports that she was diagnosed with hyperparathyroidism around 11 months 

ago and has had a parathyroid uptake which was negative for adenoma.  Sodium 

improving with IV Lasix.  Patient out of the ICU. Samsca x2. Fluid restriction. 
















Subjective: 


Patient seen and examined at bedside.  No acute events overnight.  Denies any 

new complaints.  Has a Trujillo catheter in place.





Pertinent positives and negatives as discussed above, a complete review of 

systems was performed and all other systems are negative.








Vitals Signs Reviewed. 





General: nontoxic, no distress, appears at stated age


Gen.: Generalized macular rash over her bilateral shoulder areas, chest, and 

left lower abdomen and left thigh without desquamation, scale, Crust, or 

purulent drainage.


Cardiovascular: S1S2 reg, no murmur, positive posterior tibial pulse bilateral, 


Lungs: Decreased breath sounds bilateral, no rhonchi, no rales , no accessory 

muscle use


Abdominal: soft,  nontender to palpation, no guarding, no appreciable 

organomegaly


Ext: no gross muscle atrophy, 2+ edema b/l lower extremities, no contractures


Neuro:  CN II-XI grossly intact, no focal neuro deficits


Psych: Alert, oriented, appropriate affect 





Data Reviewed Today: 


Pertinent Labs: Sodium 130, creatinine 0.62, magnesium 2.3


Imaging: No new imaging





Assessment and Plan:


Severe hyponatremia, likely hypervolemic


Breakthrough Seizure with history of epilepsy


-Seizure precautions


-Nephrology following, continue IV Lasix 40 twice a day


-Sodium level slowly improving


-Status post 3% saline, Samsca 2


-Neurology following, continue IV Keppra thousand twice a day





Dermatitis, improving


-Possibly related to Dilantin


-Could also be infectious in nature, on doxycycline 100 BID


-outpatient biopsy results pending


-on hydrocortisone cream topical, and calamine lotion





History of gastric adenoma now with recurrent lymphadenopathy on prior CT


-Retroperitoneal adenopathy persistent





Hyperparathyroidism


-Nephrology recommendations


-Patient was on Sensipar at home


-PTH RP level pending





Hypothyroidism


-Synthroid


-TSH within normal





Morbid obesity BMI 48.5


-Outpatient structured weight loss








DVT ppx: Heparin subcu





Code status: Full code





Anticipated discharge place: Subacute rehab


Anticipated discharge time: Pending clinical course





Objective





- Vital Signs


Vital signs: 


                                   Vital Signs











Temp  98.4 F   08/12/23 06:54


 


Pulse  108 H  08/12/23 08:52


 


Resp  18   08/12/23 08:52


 


BP  154/67   08/12/23 06:54


 


Pulse Ox  94 L  08/12/23 08:08


 


FiO2      








                                 Intake & Output











 08/11/23 08/12/23 08/12/23





 18:59 06:59 18:59


 


Output Total 1300 576 


 


Balance -1300 -576 


 


Weight 127.5 kg 128 kg 


 


Output:   


 


  Urine 1300  


 


  Post Void Residual  576 


 


Other:   


 


  Voiding Method   Bedpan


 


  # Voids  2 














- Labs


CBC & Chem 7: 


                                 08/09/23 03:32





                                 08/12/23 06:37


Labs: 


                  Abnormal Lab Results - Last 24 Hours (Table)











  08/12/23 Range/Units





  06:37 


 


Sodium  130 L  (137-145)  mmol/L


 


BUN  40 H  (7-17)  mg/dL


 


Glucose  144 H  (74-99)  mg/dL

## 2023-08-13 LAB
ANION GAP SERPL CALC-SCNC: 3 MMOL/L
BUN SERPL-SCNC: 48 MG/DL (ref 7–17)
CALCIUM SPEC-MCNC: 9.7 MG/DL (ref 8.4–10.2)
CHLORIDE SERPL-SCNC: 99 MMOL/L (ref 98–107)
CO2 SERPL-SCNC: 31 MMOL/L (ref 22–30)
GLUCOSE SERPL-MCNC: 144 MG/DL (ref 74–99)
POTASSIUM SERPL-SCNC: 4.3 MMOL/L (ref 3.5–5.1)
SODIUM SERPL-SCNC: 133 MMOL/L (ref 137–145)

## 2023-08-13 RX ADMIN — PANTOPRAZOLE SODIUM SCH MG: 40 INJECTION, POWDER, FOR SOLUTION INTRAVENOUS at 07:43

## 2023-08-13 RX ADMIN — HYDROCORTISONE SCH APPLIC: 1 CREAM TOPICAL at 21:43

## 2023-08-13 RX ADMIN — HEPARIN SODIUM SCH UNIT: 5000 INJECTION, SOLUTION INTRAVENOUS; SUBCUTANEOUS at 07:43

## 2023-08-13 RX ADMIN — FUROSEMIDE SCH MG: 10 INJECTION, SOLUTION INTRAMUSCULAR; INTRAVENOUS at 21:43

## 2023-08-13 RX ADMIN — HEPARIN SODIUM SCH UNIT: 5000 INJECTION, SOLUTION INTRAVENOUS; SUBCUTANEOUS at 15:43

## 2023-08-13 RX ADMIN — LEVOTHYROXINE SODIUM SCH MCG: 0.12 TABLET ORAL at 06:29

## 2023-08-13 RX ADMIN — HEPARIN SODIUM SCH UNIT: 5000 INJECTION, SOLUTION INTRAVENOUS; SUBCUTANEOUS at 00:18

## 2023-08-13 RX ADMIN — DOXYCYCLINE SCH MG: 100 CAPSULE ORAL at 07:43

## 2023-08-13 RX ADMIN — LEVETIRACETAM SCH MG: 100 INJECTION, SOLUTION, CONCENTRATE INTRAVENOUS at 07:43

## 2023-08-13 RX ADMIN — HYDROCORTISONE SCH APPLIC: 1 CREAM TOPICAL at 07:44

## 2023-08-13 RX ADMIN — FUROSEMIDE SCH MG: 10 INJECTION, SOLUTION INTRAMUSCULAR; INTRAVENOUS at 07:43

## 2023-08-13 NOTE — P.PN
Subjective





Patient is seen in follow-up for hyponatremia.  Sodium level up to 133 this 

morning.  Maintained on IV Lasix.  Received a dose of Samsca 3 days ago.  

Nonoliguric.  Maintained on IV Lasix.  Patient has significant bilateral lower 

extremities edema.


Oral intake is good.  On fluid restriction. Hemodynamically stable. 





Objective





- Vital Signs


Vital signs: 


                                   Vital Signs











Temp  97.7 F   08/13/23 08:26


 


Pulse  109 H  08/13/23 08:26


 


Resp  19   08/13/23 08:26


 


BP  152/82   08/13/23 08:26


 


Pulse Ox  91 L  08/13/23 08:26


 


FiO2      








                                 Intake & Output











 08/12/23 08/13/23 08/13/23





 18:59 06:59 18:59


 


Output Total  800 


 


Balance  -800 


 


Weight  128 kg 


 


Output:   


 


  Urine  800 


 


Other:   


 


  Voiding Method Bedpan External Catheter External Catheter


 


  # Voids  1 


 


  # Bowel Movements  1 














- Exam





Patient is awake, comfortable, no acute distress


Examination of the heart S1 and S2


Examination of the lungs decreased breath sounds at the bases


Abdomen is soft obese nontender


Examination lower extremity shows bilateral chronic skin changes with bilateral 

chronic edema 2+.


CNS exam shows patient is weak.  She can wiggle her toes but is not moving her 

legs much.





- Labs


CBC & Chem 7: 


                                 08/09/23 03:32





                                 08/13/23 07:10


Labs: 


                  Abnormal Lab Results - Last 24 Hours (Table)











  08/13/23 Range/Units





  07:10 


 


Sodium  133 L  (137-145)  mmol/L


 


Carbon Dioxide  31 H  (22-30)  mmol/L


 


BUN  48 H  (7-17)  mg/dL


 


Glucose  144 H  (74-99)  mg/dL














Assessment and Plan


Assessment: 





1.  Symptomatic hyponatremia.  Status post 3% saline.  Status post 1 dose of 

Samsca 3 days ago.  Hypervolemic.  Sodium level 133 this morning.  TSH normal.  

Urine osmolality 722.  Urine sodium less than 20.  Cortisol level not low.  

Maintained on IV Lasix.


2.  Seizure.  Possibly from hyponatremia.  Dilantin stopped a week prior to 

admission due to rash.  Now on Keppra.  Neurology following.


3.  Edema.  Improving with diuresis.


4.  Mental delay.


5.  Hyperkalemia.  Hemolyzed sample.  Repeat potassium normal. 


Plan: 





Continue with IV Lasix


Repeat sodium in a.m.


Continue with fluid restriction.

## 2023-08-13 NOTE — P.PN
Subjective


Progress Note Date: 08/13/23


Hospital Course: 


77-year-old female with a history of hyperparathyroidism, primary gastric 

adenoma with resection, hypothyroidism, and prior seizure disorder who presented

to the ER with dizziness and weakness.  While in the ER she had a witnessed 

seizure.  Initial laboratory analysis was remarkable for sodium 110, chloride 

86, carbon dioxide 19, BUN 19, creatinine 0.5.  Patient was started on normal 

saline.  Serum osmolality came back at 247 and urine osmolality of 722.  

Nephrology was consulted and the patient was started on 3% saline.  She had 

serial electrolytes ordered and she was admitted to the ICU. Per PCP , blood 

work recently on 8/1/23 and her sodium was 132, serum ionized calcium was 5.5.  

She started having significant rash on last month which has been refractory to 2

different oral steroids as well as topical steroids.  She was seen by 

dermatology and had the rash biopsied, but he has not been made aware of the 

biopsy results.  He also notes that he discontinued her Dilantin due to her 

having multiple subtherapeutic levels, no history of seizures in over 20 years, 

Concerned that Dilantin was related to her dermatitis refractory to steroids.  

He also notes that she is coming due to have a CT abdomen and pelvis that she 

has a history of a gastric adenoma that was resected at [facility] and has had a

CT of the past that demonstrated lymphadenopathy in the epigastric region.  He 

also reports that she was diagnosed with hyperparathyroidism around 11 months 

ago and has had a parathyroid uptake which was negative for adenoma.  Sodium 

improving with IV Lasix.  Patient out of the ICU. Samsca x2. Fluid restriction. 
















Subjective: 


Patient seen and examined at bedside.  No acute events overnight.  Denies any 

new complaints.  Has a Trujillo catheter in place.





Pertinent positives and negatives as discussed above, a complete review of 

systems was performed and all other systems are negative.








Vitals Signs Reviewed. 





General: nontoxic, no distress, appears at stated age


Gen.: Generalized macular rash over her bilateral shoulder areas, chest, and 

left lower abdomen and left thigh without desquamation, scale, Crust, or 

purulent drainage.


Cardiovascular: S1S2 reg, no murmur, positive posterior tibial pulse bilateral, 


Lungs: Decreased breath sounds bilateral, no rhonchi, no rales , no accessory 

muscle use


Abdominal: soft,  nontender to palpation, no guarding, no appreciable 

organomegaly


Ext: no gross muscle atrophy, 2+ edema b/l lower extremities, no contractures


Neuro:  CN II-XI grossly intact, no focal neuro deficits


Psych: Alert, oriented, appropriate affect 





Data Reviewed Today: 


Pertinent Labs: Sodium 133, creatinine 0.7


Imaging: No new imaging





Assessment and Plan:


Severe hyponatremia, likely hypervolemic


Breakthrough Seizure with history of epilepsy


-Seizure precautions


-Nephrology note reviewed., continue IV Lasix 40 twice a day


-Sodium level slowly improving


-Status post 3% saline, Samsca 2


-Neurology signed off, IV Keppra change to oral Keppra





Dermatitis, improving


-Possibly related to Dilantin


-doxycycline discontinued


-on hydrocortisone cream topical, and calamine lotion





History of gastric adenoma now with recurrent lymphadenopathy on prior CT


-Retroperitoneal adenopathy persistent





Hyperparathyroidism


-Nephrology recommendations


-Patient was on Sensipar at home


-PTH RP level pending





Hypothyroidism


-Synthroid


-TSH within normal





Morbid obesity BMI 48.5


-Outpatient structured weight loss








DVT ppx: Heparin subcu





Code status: Full code





Anticipated discharge place: Subacute rehab


Anticipated discharge time: Pending clinical course





Objective





- Vital Signs


Vital signs: 


                                   Vital Signs











Temp  97.7 F   08/13/23 08:26


 


Pulse  109 H  08/13/23 08:26


 


Resp  19   08/13/23 08:26


 


BP  152/82   08/13/23 08:26


 


Pulse Ox  91 L  08/13/23 08:26


 


FiO2      








                                 Intake & Output











 08/12/23 08/13/23 08/13/23





 18:59 06:59 18:59


 


Output Total  800 


 


Balance  -800 


 


Weight  128 kg 


 


Output:   


 


  Urine  800 


 


Other:   


 


  Voiding Method Bedpan External Catheter External Catheter


 


  # Voids  1 


 


  # Bowel Movements  1 














- Labs


CBC & Chem 7: 


                                 08/09/23 03:32





                                 08/13/23 07:10


Labs: 


                  Abnormal Lab Results - Last 24 Hours (Table)











  08/13/23 Range/Units





  07:10 


 


Sodium  133 L  (137-145)  mmol/L


 


Carbon Dioxide  31 H  (22-30)  mmol/L


 


BUN  48 H  (7-17)  mg/dL


 


Glucose  144 H  (74-99)  mg/dL

## 2023-08-14 LAB
ANION GAP SERPL CALC-SCNC: 4 MMOL/L
BUN SERPL-SCNC: 53 MG/DL (ref 7–17)
CALCIUM SPEC-MCNC: 9.8 MG/DL (ref 8.4–10.2)
CHLORIDE SERPL-SCNC: 97 MMOL/L (ref 98–107)
CO2 SERPL-SCNC: 29 MMOL/L (ref 22–30)
GLUCOSE SERPL-MCNC: 121 MG/DL (ref 74–99)
MAGNESIUM SPEC-SCNC: 2.5 MG/DL (ref 1.6–2.3)
POTASSIUM SERPL-SCNC: 4.8 MMOL/L (ref 3.5–5.1)
SODIUM SERPL-SCNC: 130 MMOL/L (ref 137–145)

## 2023-08-14 RX ADMIN — HEPARIN SODIUM SCH UNIT: 5000 INJECTION, SOLUTION INTRAVENOUS; SUBCUTANEOUS at 00:34

## 2023-08-14 RX ADMIN — HYDROCORTISONE SCH APPLIC: 1 CREAM TOPICAL at 20:48

## 2023-08-14 RX ADMIN — HEPARIN SODIUM SCH UNIT: 5000 INJECTION, SOLUTION INTRAVENOUS; SUBCUTANEOUS at 16:43

## 2023-08-14 RX ADMIN — PANTOPRAZOLE SODIUM SCH MG: 40 INJECTION, POWDER, FOR SOLUTION INTRAVENOUS at 09:04

## 2023-08-14 RX ADMIN — HYDROCORTISONE SCH APPLIC: 1 CREAM TOPICAL at 09:05

## 2023-08-14 RX ADMIN — LEVOTHYROXINE SODIUM SCH MCG: 0.12 TABLET ORAL at 06:40

## 2023-08-14 RX ADMIN — FUROSEMIDE SCH MG: 10 INJECTION, SOLUTION INTRAMUSCULAR; INTRAVENOUS at 20:48

## 2023-08-14 RX ADMIN — HEPARIN SODIUM SCH UNIT: 5000 INJECTION, SOLUTION INTRAVENOUS; SUBCUTANEOUS at 23:03

## 2023-08-14 RX ADMIN — FUROSEMIDE SCH MG: 10 INJECTION, SOLUTION INTRAMUSCULAR; INTRAVENOUS at 09:05

## 2023-08-14 RX ADMIN — HEPARIN SODIUM SCH UNIT: 5000 INJECTION, SOLUTION INTRAVENOUS; SUBCUTANEOUS at 09:05

## 2023-08-14 NOTE — P.PN
Subjective


Progress Note Date: 08/14/23


Hospital Course: 


77-year-old female with a history of hyperparathyroidism, primary gastric adeno

ma with resection, hypothyroidism, and prior seizure disorder who presented to 

the ER with dizziness and weakness.  While in the ER she had a witnessed 

seizure.  Initial laboratory analysis was remarkable for sodium 110, chloride 

86, carbon dioxide 19, BUN 19, creatinine 0.5.  Patient was started on normal 

saline.  Serum osmolality came back at 247 and urine osmolality of 722.  

Nephrology was consulted and the patient was started on 3% saline.  She had 

serial electrolytes ordered and she was admitted to the ICU. Per PCP , blood 

work recently on 8/1/23 and her sodium was 132, serum ionized calcium was 5.5.  

She started having significant rash on last month which has been refractory to 2

different oral steroids as well as topical steroids.  She was seen by 

dermatology and had the rash biopsied, but he has not been made aware of the 

biopsy results.  He also notes that he discontinued her Dilantin due to her 

having multiple subtherapeutic levels, no history of seizures in over 20 years, 

Concerned that Dilantin was related to her dermatitis refractory to steroids.  

He also notes that she is coming due to have a CT abdomen and pelvis that she 

has a history of a gastric adenoma that was resected at [facility] and has had a

CT of the past that demonstrated lymphadenopathy in the epigastric region.  He 

also reports that she was diagnosed with hyperparathyroidism around 11 months 

ago and has had a parathyroid uptake which was negative for adenoma.  Sodium 

improving with IV Lasix.  Patient out of the ICU. Samsca x2. Fluid restriction. 
















Subjective: 


Patient seen and examined at bedside.  No acute events overnight.  Denies any 

new complaints.  Has a Trujillo catheter in place.





Pertinent positives and negatives as discussed above, a complete review of 

systems was performed and all other systems are negative.








Vitals Signs Reviewed. 





General: nontoxic, no distress, appears at stated age


Gen.: Generalized macular rash over her bilateral shoulder areas, chest, and 

left lower abdomen and left thigh without desquamation, scale, Crust, or 

purulent drainage.


Cardiovascular: S1S2 reg, no murmur, positive posterior tibial pulse bilateral, 


Lungs: Decreased breath sounds bilateral, no rhonchi, no rales , no accessory 

muscle use


Abdominal: soft,  nontender to palpation, no guarding, no appreciable 

organomegaly


Ext: no gross muscle atrophy, 2+ edema b/l lower extremities, no contractures


Neuro:  CN II-XI grossly intact, no focal neuro deficits


Psych: Alert, oriented, appropriate affect 





Data Reviewed Today: 


Pertinent Labs: Sodium 1:30, creatinine 0.67, magnesium 2.5


Imaging: No new imaging





Assessment and Plan:


Severe hyponatremia, likely hypervolemic, improving


Breakthrough Seizure with history of epilepsy


-Seizure precautions


-Nephrology note reviewed., continue IV Lasix 40 twice a day, fluid restriction


-Sodium level slowly improving


-Status post 3% saline, Samsca 2


-Neurology signed off, IV Keppra change to oral Keppra





Dermatitis, improving


-Possibly related to Dilantin


-on hydrocortisone cream topical, and calamine lotion





History of gastric adenoma now with recurrent lymphadenopathy on prior CT


-Retroperitoneal adenopathy persistent


-Needs further workup outpatient





Hyperparathyroidism


-Nephrology recommendations


-Patient was on Sensipar at home


-PTH RP level pending





Hypothyroidism


-Synthroid


-TSH within normal





Morbid obesity BMI 48.5


-Outpatient structured weight loss








DVT ppx: Heparin subcu





Code status: Full code





Anticipated discharge place: Subacute rehab


Anticipated discharge time: Once patient is on oral Lasix





Objective





- Vital Signs


Vital signs: 


                                   Vital Signs











Temp  98.0 F   08/14/23 08:11


 


Pulse  100   08/14/23 08:11


 


Resp  17   08/14/23 08:11


 


BP  124/68   08/14/23 08:11


 


Pulse Ox  98   08/14/23 09:26


 


FiO2      








                                 Intake & Output











 08/13/23 08/14/23 08/14/23





 18:59 06:59 18:59


 


Output Total 550  


 


Balance -550  


 


Weight  128.5 kg 


 


Output:   


 


  Urine 550  


 


Other:   


 


  Voiding Method External Catheter External Catheter External Catheter


 


  # Voids  2 














- Labs


CBC & Chem 7: 


                                 08/09/23 03:32





                                 08/14/23 05:39


Labs: 


                  Abnormal Lab Results - Last 24 Hours (Table)











  08/14/23 Range/Units





  05:39 


 


Sodium  130 L  (137-145)  mmol/L


 


Chloride  97 L  ()  mmol/L


 


BUN  53 H  (7-17)  mg/dL


 


Glucose  121 H  (74-99)  mg/dL


 


Magnesium  2.5 H  (1.6-2.3)  mg/dL

## 2023-08-14 NOTE — P.PN
Subjective





Patient is seen in follow-up for hyponatremia.  Sodium level  130 this morning. 

Maintained on IV Lasix.  Received a dose of Samsca few days ago.  Nonoliguric.  

Maintained on IV Lasix.  Patient has significant bilateral lower extremities 

edema.


Oral intake is good.  On fluid restriction. Hemodynamically stable. 





Objective





- Vital Signs


Vital signs: 


                                   Vital Signs











Temp  98.0 F   08/14/23 08:11


 


Pulse  100   08/14/23 08:11


 


Resp  17   08/14/23 08:11


 


BP  124/68   08/14/23 08:11


 


Pulse Ox  98   08/14/23 09:26


 


FiO2      








                                 Intake & Output











 08/13/23 08/14/23 08/14/23





 18:59 06:59 18:59


 


Output Total 550  


 


Balance -550  


 


Weight  128.5 kg 


 


Output:   


 


  Urine 550  


 


Other:   


 


  Voiding Method External Catheter External Catheter External Catheter


 


  # Voids  2 














- Exam





Patient is awake, comfortable, no acute distress


Examination of the heart S1 and S2


Examination of the lungs decreased breath sounds at the bases


Abdomen is soft obese nontender


Examination lower extremity shows bilateral chronic skin changes with bilateral 

chronic edema 2+.


CNS exam shows patient is weak.  She can wiggle her toes but is not moving her l

egs much.





- Labs


CBC & Chem 7: 


                                 08/09/23 03:32





                                 08/14/23 05:39


Labs: 


                  Abnormal Lab Results - Last 24 Hours (Table)











  08/14/23 Range/Units





  05:39 


 


Sodium  130 L  (137-145)  mmol/L


 


Chloride  97 L  ()  mmol/L


 


BUN  53 H  (7-17)  mg/dL


 


Glucose  121 H  (74-99)  mg/dL


 


Magnesium  2.5 H  (1.6-2.3)  mg/dL














Assessment and Plan


Assessment: 





1.  Symptomatic hyponatremia.  Status post 3% saline.  Status post 1 dose of 

Samsca few days ago.  Hypervolemic.  Sodium level 130 this morning.  TSH normal.

 Urine osmolality 722.  Urine sodium less than 20.  Cortisol level not low.  Usha

ntained on IV Lasix.


2.  Seizure.  Possibly from hyponatremia.  Dilantin stopped a week prior to adm

ission due to rash.  Now on Keppra.  Neurology following.


3.  Edema.  Improving with diuresis.


4.  Mental delay.


5.  Hyperkalemia.  Hemolyzed sample.  Repeat potassium normal. 


Plan: 





Continue with IV Lasix


Repeat sodium in a.m.


Continue with fluid restriction.

## 2023-08-15 VITALS — SYSTOLIC BLOOD PRESSURE: 146 MMHG | HEART RATE: 104 BPM | DIASTOLIC BLOOD PRESSURE: 70 MMHG | TEMPERATURE: 97.8 F

## 2023-08-15 VITALS — RESPIRATION RATE: 18 BRPM

## 2023-08-15 LAB
ANION GAP SERPL CALC-SCNC: 2 MMOL/L
BUN SERPL-SCNC: 50 MG/DL (ref 7–17)
CALCIUM SPEC-MCNC: 9.7 MG/DL (ref 8.4–10.2)
CHLORIDE SERPL-SCNC: 100 MMOL/L (ref 98–107)
CO2 SERPL-SCNC: 30 MMOL/L (ref 22–30)
GLUCOSE SERPL-MCNC: 128 MG/DL (ref 74–99)
MAGNESIUM SPEC-SCNC: 2.4 MG/DL (ref 1.6–2.3)
POTASSIUM SERPL-SCNC: 4.8 MMOL/L (ref 3.5–5.1)
SODIUM SERPL-SCNC: 132 MMOL/L (ref 137–145)

## 2023-08-15 RX ADMIN — HEPARIN SODIUM SCH UNIT: 5000 INJECTION, SOLUTION INTRAVENOUS; SUBCUTANEOUS at 09:48

## 2023-08-15 RX ADMIN — LEVOTHYROXINE SODIUM SCH MCG: 0.12 TABLET ORAL at 05:32

## 2023-08-15 RX ADMIN — HYDROCORTISONE SCH APPLIC: 1 CREAM TOPICAL at 09:48

## 2023-08-15 RX ADMIN — PANTOPRAZOLE SODIUM SCH MG: 40 INJECTION, POWDER, FOR SOLUTION INTRAVENOUS at 09:47

## 2023-08-15 RX ADMIN — FUROSEMIDE SCH MG: 10 INJECTION, SOLUTION INTRAMUSCULAR; INTRAVENOUS at 09:47

## 2023-08-15 NOTE — P.DS
Providers


Date of admission: 


08/06/23 19:54





Expected date of discharge: 08/15/23


Attending physician: 


Liza Goss MD





Consults: 





                                        





08/06/23 19:47


Consult Physician Routine 


   Consulting Provider: Yue Melvin


   Consult Reason/Comments: icu


   Do you want consulting provider notified?: Yes


Consult Physician Routine 


   Consulting Provider: Miryam Hinojosa


   Consult Reason/Comments: hypoNa


   Do you want consulting provider notified?: Yes





08/06/23 20:04


Consult Physician Routine 


   Consulting Provider: Rogers Nolen


   Consult Reason/Comments: sz


   Do you want consulting provider notified?: Yes











Primary care physician: 


Justino Allen MD





Hospital Course: 





77-year-old female with a history of hyperparathyroidism, primary gastric 

adenoma with resection, hypothyroidism, and prior seizure disorder who presented

to the ER with dizziness and weakness.  While in the ER she had a witnessed 

seizure.  Initial laboratory analysis was remarkable for sodium 110, chloride 

86, carbon dioxide 19, BUN 19, creatinine 0.5.  Patient was started on normal 

saline.  Serum osmolality came back at 247 and urine osmolality of 722.  

Nephrology was consulted and the patient was started on 3% saline.  She had 

serial electrolytes ordered and she was admitted to the ICU. Per PCP , blood 

work recently on 8/1/23 and her sodium was 132, serum ionized calcium was 5.5.  

She started having significant rash on last month which has been refractory to 2

different oral steroids as well as topical steroids.  She was seen by 

dermatology and had the rash biopsied, but he has not been made aware of the 

biopsy results.  He also notes that he discontinued her Dilantin due to her 

having multiple subtherapeutic levels, no history of seizures in over 20 years, 

Concerned that Dilantin was related to her dermatitis refractory to steroids.  

He also notes that she is coming due to have a CT abdomen and pelvis that she 

has a history of a gastric adenoma that was resected at [facility] and has had a

CT of the past that demonstrated lymphadenopathy in the epigastric region.  He 

also reports that she was diagnosed with hyperparathyroidism around 11 months 

ago and has had a parathyroid uptake which was negative for adenoma.  Sodium 

improving with IV Lasix.  Patient out of the ICU. Samsca x2. Fluid restriction. 







8/15 Patient was seen and examined. She is upset that she couldnt go to rehab 

yesterday. She has no other complaints. Sodium level is 132 today. Case was 

discussed with Dr. Hinojosa who recommends Lasix 40 mg PO BID and is OK with 

discharge to SNF. Advised to follow up with her PCP within 1-2 days of disch

arge. We will give her an appointment with GI Dr. Cobb and Oncology Dr. Walker

for history of gastric adenoma with recurrent LAD as seen on CT.  





Pertinent studies include chest x-ray, echocardiogram, brain CT, CTA PE, EEG.





Vitals Signs: /74. . T 97.6F. RR 18. 97% on RA.





General: nontoxic, no distress, appears at stated age


Gen.: Generalized macular rash over her bilateral shoulder areas, chest, and 

left lower abdomen and left thigh without desquamation, scale, Crust, or 

purulent drainage.


Cardiovascular: S1S2 reg, no murmur


Lungs: Decreased breath sounds bilateral, no rhonchi, no rales , no accessory 

muscle use


Abdominal: soft,  nontender to palpation


Ext: no gross muscle atrophy, 2+ edema b/l lower extremities, no contractures


Neuro: no focal neuro deficits


Psych: Alert, oriented, appropriate affect 





Discharge diagnosis:





Severe hyponatremia, likely hypervolemic, improving


Breakthrough Seizure with history of epilepsy


Dermatitis, improving


History of gastric adenoma now with recurrent lymphadenopathy on prior CT


Hyperparathyroidism


Hypothyroidism


Morbid obesity BMI 48.5





This complex discharge took 35 minutes to complete.


Patient Condition at Discharge: Stable





Plan - Discharge Summary


Discharge Rx Participant: No


New Discharge Prescriptions: 


New


   levETIRAcetam [Keppra] 1,000 mg PO Q12HR  tab


   Furosemide [Lasix] 40 mg PO BID@0900,1600  tab


   Pantoprazole [Protonix] 40 mg PO DAILY #30 tab


   Calamine/Zinc Oxide Lotion [Calamine Lotion] 1 applic TOPICAL TID PRN  each


     PRN Reason: Skin Irritation





Continue


   Primidone 250 mg PO HS


   Potassium Chloride 10 meq PO DAILY


   Montelukast [Singulair] 10 mg PO DAILY


   Cinacalcet [Sensipar] 30 mg PO DAILY


   predniSONE See Taper PO DAILY


   Levothyroxine Sodium 125 mcg PO DAILY


   Hydrocortisone Oint [Hydrocortisone 2.5% Oint] 1 applic TOPICAL BID


   Betamethasone Dipropionate [Betamethasone Dipropionate 0.05%] 1 applic 

TOPICAL BID





Discontinued


   Phenytoin Sodium Extended [Dilantin] 200 mg PO BID


Discharge Medication List





Levothyroxine Sodium 125 mcg PO DAILY 08/20/21 [History]


Primidone 250 mg PO HS 08/20/21 [History]


Potassium Chloride 10 meq PO DAILY 08/23/21 [History]


Betamethasone Dipropionate [Betamethasone Dipropionate 0.05%] 1 applic TOPICAL 

BID 08/06/23 [History]


Cinacalcet [Sensipar] 30 mg PO DAILY 08/06/23 [History]


Hydrocortisone Oint [Hydrocortisone 2.5% Oint] 1 applic TOPICAL BID 08/06/23 

[History]


Montelukast [Singulair] 10 mg PO DAILY 08/06/23 [History]


predniSONE See Taper PO DAILY 08/06/23 [History]


Calamine/Zinc Oxide Lotion [Calamine Lotion] 1 applic TOPICAL TID PRN  each 

08/15/23 [Rx]


Furosemide [Lasix] 40 mg PO BID@0900,1600  tab 08/15/23 [Rx]


Pantoprazole [Protonix] 40 mg PO DAILY #30 tab 08/15/23 [Rx]


levETIRAcetam [Keppra] 1,000 mg PO Q12HR  tab 08/15/23 [Rx]








Follow up Appointment(s)/Referral(s): 


Justino Allen MD [Primary Care Provider] - 1-2 days


Bonita Cobb MD [STAFF PHYSICIAN] - 1 Week


Parker Walker MD [STAFF PHYSICIAN] - 1 Week


Activity/Diet/Wound Care/Special Instructions: 


Diet: 1.5 L fluid restriction


Follow up with your PCP within 1-2 days of discharge.


Follow up with GI Dr. Cobb and Oncology Dr. Walker regarding history of 

gastric adenoma with increased lymphadenopathy seen on CT.





Discharge Disposition: TRANSFER TO SNF/F

## 2023-08-15 NOTE — P.PN
Subjective





Patient is seen in follow-up for hyponatremia.  Sodium level  132 this morning. 

Maintained on IV Lasix.  Received a dose of Samsca few days ago.  Nonoliguric.  

Maintained on IV Lasix.  Patient has significant bilateral lower extremities 

edema.


Oral intake is good.  On fluid restriction. Hemodynamically stable. 





Objective





- Vital Signs


Vital signs: 


                                   Vital Signs











Temp  97.6 F   08/15/23 07:24


 


Pulse  101 H  08/15/23 07:24


 


Resp  18   08/15/23 07:24


 


BP  158/74   08/15/23 07:24


 


Pulse Ox  95   08/15/23 12:51


 


FiO2      








                                 Intake & Output











 08/14/23 08/15/23 08/15/23





 18:59 06:59 18:59


 


Intake Total  480 240


 


Output Total 900 1250 300


 


Balance -900 -770 -60


 


Weight  128 kg 


 


Intake:   


 


  Oral  480 240


 


Output:   


 


  Urine 900 1250 300


 


Other:   


 


  Voiding Method External Catheter External Catheter External Catheter


 


  # Bowel Movements  1 














- Exam





Patient is awake, comfortable, no acute distress


Examination of the heart S1 and S2


Examination of the lungs decreased breath sounds at the bases


Abdomen is soft obese nontender


Examination lower extremity shows bilateral chronic skin changes with bilateral 

chronic edema 2+.


CNS exam shows patient is weak.  She can wiggle her toes but is not moving her 

legs much.





- Labs


CBC & Chem 7: 


                                 08/09/23 03:32





                                 08/15/23 05:22


Labs: 


                  Abnormal Lab Results - Last 24 Hours (Table)











  08/15/23 Range/Units





  05:22 


 


Sodium  132 L  (137-145)  mmol/L


 


BUN  50 H  (7-17)  mg/dL


 


Glucose  128 H  (74-99)  mg/dL


 


Magnesium  2.4 H  (1.6-2.3)  mg/dL














Assessment and Plan


Assessment: 





1.  Symptomatic hyponatremia.  Status post 3% saline.  Status post 1 dose of 

Samsca few days ago.  Hypervolemic.  Sodium level 132 this morning.  TSH normal.

 Urine osmolality 722.  Urine sodium less than 20.  Cortisol level not low.  

Maintained on IV Lasix.


2.  Seizure.  Possibly from hyponatremia.  Dilantin stopped a week prior to 

admission due to rash.  Now on Keppra.  Neurology following.


3.  Edema.  Improving with diuresis.


4.  Mental delay.


5.  Hyperkalemia.  Hemolyzed sample.  Repeat potassium normal. 


Plan: 





Okay to switch to oral Lasix and patient is stable for discharge from nephrology

standpoint.


Maintain fluid restriction.

## 2023-09-09 ENCOUNTER — HOSPITAL ENCOUNTER (INPATIENT)
Dept: HOSPITAL 47 - EC | Age: 78
LOS: 2 days | Discharge: HOSPICE | DRG: 871 | End: 2023-09-11
Attending: INTERNAL MEDICINE | Admitting: INTERNAL MEDICINE
Payer: MEDICARE

## 2023-09-09 VITALS — BODY MASS INDEX: 44.6 KG/M2

## 2023-09-09 DIAGNOSIS — E21.3: ICD-10-CM

## 2023-09-09 DIAGNOSIS — R62.7: ICD-10-CM

## 2023-09-09 DIAGNOSIS — Z88.0: ICD-10-CM

## 2023-09-09 DIAGNOSIS — E87.1: ICD-10-CM

## 2023-09-09 DIAGNOSIS — E03.9: ICD-10-CM

## 2023-09-09 DIAGNOSIS — L03.115: ICD-10-CM

## 2023-09-09 DIAGNOSIS — Z79.899: ICD-10-CM

## 2023-09-09 DIAGNOSIS — N17.9: ICD-10-CM

## 2023-09-09 DIAGNOSIS — Z51.5: ICD-10-CM

## 2023-09-09 DIAGNOSIS — Z20.822: ICD-10-CM

## 2023-09-09 DIAGNOSIS — B95.2: ICD-10-CM

## 2023-09-09 DIAGNOSIS — Z66: ICD-10-CM
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
## 2023-09-09 DIAGNOSIS — N18.9: ICD-10-CM

## 2023-09-09 DIAGNOSIS — J69.0: ICD-10-CM

## 2023-09-09 DIAGNOSIS — E11.22: ICD-10-CM

## 2023-09-09 DIAGNOSIS — I12.9: ICD-10-CM

## 2023-09-09 DIAGNOSIS — G40.909: ICD-10-CM

## 2023-09-09 DIAGNOSIS — Z79.890: ICD-10-CM

## 2023-09-09 DIAGNOSIS — E87.70: ICD-10-CM

## 2023-09-09 DIAGNOSIS — G93.41: ICD-10-CM

## 2023-09-09 DIAGNOSIS — A41.9: Primary | ICD-10-CM

## 2023-09-09 DIAGNOSIS — B95.62: ICD-10-CM

## 2023-09-09 DIAGNOSIS — E87.5: ICD-10-CM

## 2023-09-09 LAB
ALBUMIN SERPL-MCNC: 2.3 G/DL (ref 3.5–5)
ALP SERPL-CCNC: 96 U/L (ref 38–126)
ALT SERPL-CCNC: 39 U/L (ref 4–34)
ANION GAP SERPL CALC-SCNC: 10 MMOL/L
APTT BLD: 32.4 SEC (ref 22–30)
AST SERPL-CCNC: 90 U/L (ref 14–36)
BASOPHILS # BLD AUTO: 0 K/UL (ref 0–0.2)
BASOPHILS NFR BLD AUTO: 0 %
BUN SERPL-SCNC: 94 MG/DL (ref 7–17)
CALCIUM SPEC-MCNC: 8.6 MG/DL (ref 8.4–10.2)
CHLORIDE SERPL-SCNC: 101 MMOL/L (ref 98–107)
CO2 SERPL-SCNC: 21 MMOL/L (ref 22–30)
EOSINOPHIL # BLD AUTO: 0.1 K/UL (ref 0–0.7)
EOSINOPHIL NFR BLD AUTO: 1 %
ERYTHROCYTE [DISTWIDTH] IN BLOOD BY AUTOMATED COUNT: 3.64 M/UL (ref 3.8–5.4)
ERYTHROCYTE [DISTWIDTH] IN BLOOD: 17.3 % (ref 11.5–15.5)
GLUCOSE BLD-MCNC: 212 MG/DL (ref 70–110)
GLUCOSE BLD-MCNC: 91 MG/DL (ref 70–110)
GLUCOSE SERPL-MCNC: 93 MG/DL (ref 74–99)
HCT VFR BLD AUTO: 35.3 % (ref 34–46)
HGB BLD-MCNC: 11.1 GM/DL (ref 11.4–16)
INR PPP: 1 (ref ?–1.2)
LYMPHOCYTES # SPEC AUTO: 0.3 K/UL (ref 1–4.8)
LYMPHOCYTES NFR SPEC AUTO: 3 %
MCH RBC QN AUTO: 30.4 PG (ref 25–35)
MCHC RBC AUTO-ENTMCNC: 31.3 G/DL (ref 31–37)
MCV RBC AUTO: 97 FL (ref 80–100)
MONOCYTES # BLD AUTO: 0.4 K/UL (ref 0–1)
MONOCYTES NFR BLD AUTO: 4 %
NEUTROPHILS # BLD AUTO: 10.1 K/UL (ref 1.3–7.7)
NEUTROPHILS NFR BLD AUTO: 92 %
NT-PROBNP SERPL-MCNC: 231 PG/ML
PLATELET # BLD AUTO: 66 K/UL (ref 150–450)
POTASSIUM SERPL-SCNC: 5.8 MMOL/L (ref 3.5–5.1)
PROT SERPL-MCNC: 5.4 G/DL (ref 6.3–8.2)
PT BLD: 10.3 SEC (ref 9–12)
SODIUM SERPL-SCNC: 132 MMOL/L (ref 137–145)
WBC # BLD AUTO: 11 K/UL (ref 3.8–10.6)

## 2023-09-09 PROCEDURE — 87635 SARS-COV-2 COVID-19 AMP PRB: CPT

## 2023-09-09 PROCEDURE — 36415 COLL VENOUS BLD VENIPUNCTURE: CPT

## 2023-09-09 PROCEDURE — 80053 COMPREHEN METABOLIC PANEL: CPT

## 2023-09-09 PROCEDURE — 80177 DRUG SCRN QUAN LEVETIRACETAM: CPT

## 2023-09-09 PROCEDURE — 96365 THER/PROPH/DIAG IV INF INIT: CPT

## 2023-09-09 PROCEDURE — 84132 ASSAY OF SERUM POTASSIUM: CPT

## 2023-09-09 PROCEDURE — 76770 US EXAM ABDO BACK WALL COMP: CPT

## 2023-09-09 PROCEDURE — 81003 URINALYSIS AUTO W/O SCOPE: CPT

## 2023-09-09 PROCEDURE — 85730 THROMBOPLASTIN TIME PARTIAL: CPT

## 2023-09-09 PROCEDURE — 74176 CT ABD & PELVIS W/O CONTRAST: CPT

## 2023-09-09 PROCEDURE — 94760 N-INVAS EAR/PLS OXIMETRY 1: CPT

## 2023-09-09 PROCEDURE — 95816 EEG AWAKE AND DROWSY: CPT

## 2023-09-09 PROCEDURE — 87449 NOS EACH ORGANISM AG IA: CPT

## 2023-09-09 PROCEDURE — 71046 X-RAY EXAM CHEST 2 VIEWS: CPT

## 2023-09-09 PROCEDURE — 83605 ASSAY OF LACTIC ACID: CPT

## 2023-09-09 PROCEDURE — 85025 COMPLETE CBC W/AUTO DIFF WBC: CPT

## 2023-09-09 PROCEDURE — 86140 C-REACTIVE PROTEIN: CPT

## 2023-09-09 PROCEDURE — 71045 X-RAY EXAM CHEST 1 VIEW: CPT

## 2023-09-09 PROCEDURE — 84484 ASSAY OF TROPONIN QUANT: CPT

## 2023-09-09 PROCEDURE — 87077 CULTURE AEROBIC IDENTIFY: CPT

## 2023-09-09 PROCEDURE — 87186 SC STD MICRODIL/AGAR DIL: CPT

## 2023-09-09 PROCEDURE — 85610 PROTHROMBIN TIME: CPT

## 2023-09-09 PROCEDURE — 83880 ASSAY OF NATRIURETIC PEPTIDE: CPT

## 2023-09-09 PROCEDURE — 87040 BLOOD CULTURE FOR BACTERIA: CPT

## 2023-09-09 PROCEDURE — 80048 BASIC METABOLIC PNL TOTAL CA: CPT

## 2023-09-09 PROCEDURE — 99285 EMERGENCY DEPT VISIT HI MDM: CPT

## 2023-09-09 PROCEDURE — 85027 COMPLETE CBC AUTOMATED: CPT

## 2023-09-09 RX ADMIN — LEVETIRACETAM SCH MG: 100 INJECTION, SOLUTION, CONCENTRATE INTRAVENOUS at 21:38

## 2023-09-09 RX ADMIN — CEFAZOLIN SCH MLS/HR: 330 INJECTION, POWDER, FOR SOLUTION INTRAMUSCULAR; INTRAVENOUS at 16:22

## 2023-09-09 RX ADMIN — PRIMIDONE SCH: 250 TABLET ORAL at 21:46

## 2023-09-09 RX ADMIN — MORPHINE SULFATE PRN MG: 2 INJECTION, SOLUTION INTRAMUSCULAR; INTRAVENOUS at 21:39

## 2023-09-09 NOTE — XR
EXAMINATION TYPE: XR chest 2V

 

DATE OF EXAM: 9/9/2023

 

COMPARISON: 8/7/2023

 

INDICATION: Difficulty Breathing

 

TECHNIQUE:  Frontal and lateral views of the chest are obtained.

 

FINDINGS:  

The heart size is upper limits of normal for size. Degree of inspiration is somewhat limited. 

The pulmonary vasculature is normal.

The lungs are clear.

 

IMPRESSION:  

1. No acute pulmonary process.

## 2023-09-09 NOTE — ED
General Adult HPI





<Marine Marte - Last Filed: 09/09/23 14:18>





- General


Source: patient, EMS, RN notes reviewed


Mode of arrival: EMS


Limitations: altered mental status, physical limitation





<Sukhjinder Pryor - Last Filed: 09/09/23 15:22>





- General


Chief complaint: Recheck/Abnormal Lab/Rx


Stated complaint: Aspiration


Time Seen by Provider: 09/09/23 11:40





- History of Present Illness


Initial comments: 





Patient is a pleasant 78-year-old female presenting to the emergency department 

with concern for possible aspiration.  Patient comes from nursing home.  Patient

is a very poor historian.  Patient states he feels fine and has no complaints.  

Patient reportedly has been not swallowing and they're concerned that patient 

may be aspirating. (Sukhjinder Pryor)





- Related Data


                                Home Medications











 Medication  Instructions  Recorded  Confirmed


 


Levothyroxine Sodium 125 mcg PO DAILY 08/20/21 08/06/23


 


Primidone 250 mg PO HS 08/20/21 08/06/23


 


Potassium Chloride 10 meq PO DAILY 08/23/21 08/06/23


 


Betamethasone Dipropionate 1 applic TOPICAL BID 08/06/23 08/06/23





[Betamethasone Dipropionate 0.05%]   


 


Cinacalcet [Sensipar] 30 mg PO DAILY 08/06/23 08/06/23


 


Hydrocortisone Oint 1 applic TOPICAL BID 08/06/23 08/06/23





[Hydrocortisone 2.5% Oint]   


 


Montelukast [Singulair] 10 mg PO DAILY 08/06/23 08/06/23








                                  Previous Rx's











 Medication  Instructions  Recorded


 


Calamine/Zinc Oxide Lotion 1 applic TOPICAL TID PRN  each 08/15/23





[Calamine Lotion]  


 


Furosemide [Lasix] 40 mg PO BID@0900,1600  tab 08/15/23


 


Pantoprazole [Protonix] 40 mg PO DAILY #30 tab 08/15/23


 


levETIRAcetam [Keppra] 1,000 mg PO Q12HR  tab 08/15/23











                                    Allergies











Allergy/AdvReac Type Severity Reaction Status Date / Time


 


Penicillins Allergy  Rash/Hives Verified 08/06/23 20:18














Review of Systems


ROS Other: All systems not noted in ROS Statement are negative.





<Marine Marte - Last Filed: 09/09/23 14:18>


ROS Other: All systems not noted in ROS Statement are negative.


Respiratory: Reports: as per HPI





<Sukhjinder Pryor - Last Filed: 09/09/23 15:22>


ROS Statement: 


Those systems with pertinent positive or pertinent negative responses have been 

documented in the HPI.








Past Medical History


Past Medical History: Seizure Disorder, Thyroid Disorder


History of Any Multi-Drug Resistant Organisms: None Reported


Past Surgical History: No Surgical Hx Reported


Additional Past Surgical History / Comment(s): cataract surgery, non-cancerous 

stomach growth removal


Smoking Status: Never smoker





<Sukhjinder Pryor - Last Filed: 09/09/23 15:22>





General Exam


Limitations: altered mental status, physical limitation


General appearance: alert, in no apparent distress


Head exam: Present: atraumatic


Eye exam: Present: normal appearance


ENT exam: Present: other (Erythema of the mouth or throat with white coating of 

tongue)


Neck exam: Present: normal inspection


Respiratory exam: Present: rales


Cardiovascular Exam: Present: regular rate, normal rhythm


GI/Abdominal exam: Present: soft.  Absent: tenderness


Extremities exam: Present: normal inspection


Neurological exam: Present: alert


Psychiatric exam: Present: flat affect


Skin exam: Present: other (Diffuse patchy erythematous type rash)





<Sukhjinder Pryor - Last Filed: 09/09/23 15:22>





Course


                                   Vital Signs











  09/09/23 09/09/23





  11:59 14:34


 


Pulse Rate 71 67


 


Respiratory 22 18





Rate  


 


Blood Pressure 107/61 102/50


 


O2 Sat by Pulse 95 97





Oximetry  














EKG Findings





- EKG Results:


EKG: interpreted by ERMD, sinus rhythm (First AV block .), normal QRS, 

normal ST/T





<Sukhjinder Pryor - Last Filed: 09/09/23 15:22>





Procedures





- EJ/Peripheral Line


  ** No standard instances


Consent Obtained: verbal consent


Indications: nurses unable to establish peripheral IV


Skin Cleansed in Sterile Fashion: Yes


Size: 22 (right breast)


Dressing Placed: Tegaderm


Patient Tolerated Procedure: well, no complications





<Marine Marte - Last Filed: 09/09/23 14:18>





- EJ/Peripheral Line


  ** No standard instances


Additional Comments: 





Pt tolerated well  (Marine Marte)





Medical Decision Making





- Lab Data


Result diagrams: 


                                 09/09/23 13:33





<Marine Marte - Last Filed: 09/09/23 14:18>





- Lab Data


Result diagrams: 


                                 09/09/23 13:33





                                 09/09/23 13:33





<Sukhjinder Pryor - Last Filed: 09/09/23 15:22>





- Medical Decision Making





Was pt. sent in by a medical professional or institution ([facility], NP, urgent 

care, hospital, or nursing home...) When possible be specific


@  -Patient was sent from nursing facility


Did you speak to anyone other than the patient for history (EMS, parent, family,

 police, friend...)? What history was obtained from this source 


@  -No


Did you review nursing and triage notes (agree or disagree)?  Why? 


@  -I reviewed and agree with nursing and triage notes


Were old charts reviewed (outside hosp., previous admission, EMS record, old 

EKG, old radiological studies, urgent care reports/EKG's, nursing home records)?

 Report findings 


@  -Transfer report from transfer facility reviewed


Differential Diagnosis (chest pain, altered mental status, abdominal pain women,

 abdominal pain men, vaginal bleeding, weakness, fever, dyspnea, syncope, 

headache, dizziness, GI bleed, back pain, seizure, CVA, palpatations, mental 

health, musculoskeletal)? 


@  -Differential Dyspnea:


Coronary syndrome, arrhythmia, tamponade, asthma, COPD, pulmonary embolism, 

pneumonia, pneumothorax, pulmonary effusion, anaphylaxis, diabetic ketoacidosis,

 flailed chest, pulmonary contusion, diaphragmatic rupture, anemia, n

euromuscular, this is not meant to be an all-inclusive list. 


EKG interpreted by me (3pts min.).


@  -As above


X-rays interpreted by me (1pt min.).


@  -2 view chest x-ray is rotated, cannot seclude left lower lobe opacity


CT interpreted by me (1pt min.).


@  -None done


U/S interpreted by me (1pt. min.).


@  -None done


What testing was considered but not performed or refused? (CT, X-rays, U/S, 

labs)? Why?


@  -None


What meds were considered but not given or refused? Why?


@  -None


Did you discuss the management of the patient with other professionals (mellissa nelson i.e. [facility] Mendez, NP, lab, RT, psych nurse, , , teacher, 

, )? Give summary


@  -[facility] for admission covering for Dr. Levy


Was smoking cessation discussed for >3mins.?


@  -No


Was critical care preformed (if so, how long)?


@  -No


Were there social determinants of health that impacted care today? How? 

(Homelessness, low income, unemployed, alcoholism, drug addiction, 

transportation, low edu. Level, literacy, decrease access to med. care, halfway, 

rehab)?


@  -No


Was there de-escalation of care discussed even if they declined (Discuss DNR or 

withdrawal of care, Hospice)? DNR status


@  -No


What co-morbidities impacted this encounter? (DM, HTN, Smoking, COPD, CAD, 

Cancer, CVA, ARF, Chemo, Hep., AIDS, mental health diagnosis, sleep apnea, 

morbid obesity)?


@  -None


Was patient admitted / discharged? Hospital course, mention meds given and 

route, prescriptions, significant lab abnormalities, going to OR and other 

pertinent info.


@  -Patient reevaluated.  Patient has 2) present who are updated.  They state 

there is no family in the area and patient does have guardian.  Patient will be 

admitted.  Patient has a TIA and nephrology will consult.  Cannot rule out 

aspiration and IV antibiotics will be started.  Patient has poor prognosis.  

Admission orders written.


Undiagnosed new problem with uncertain prognosis?


@  -No


Drug Therapy requiring intensive monitoring for toxicity (Heparin, Nitro, 

Insulin, Cardizem)?


@  -No


Were any procedures done?


@  -No


Diagnosis/symptom?


@  -ольга, aspiration pneumonia


Acute, or Chronic, or Acute on Chronic?


@  -Acute, acute


Uncomplicated (without systemic symptoms) or Complicated (systemic symptoms)?


@  -default


Side effects of treatment?


@  -No


Exacerbation, Progression, or Severe Exacerbation?


@  -No


Poses a threat to life or bodily function? How? (Chest pain, USA, MI, pneumonia,

 PE, COPD, DKA, ARF, appy, cholecystitis, CVA, Diverticulitis, Homicidal, S

uicidal, threat to staff... and all critical care pts)


@  -No (Sukhjinder Pryor)





- Lab Data


                                   Lab Results











  09/09/23 09/09/23 09/09/23 Range/Units





  13:33 13:33 13:33 


 


WBC  11.0 H    (3.8-10.6)  k/uL


 


RBC  3.64 L    (3.80-5.40)  m/uL


 


Hgb  11.1 L    (11.4-16.0)  gm/dL


 


Hct  35.3    (34.0-46.0)  %


 


MCV  97.0    (80.0-100.0)  fL


 


MCH  30.4    (25.0-35.0)  pg


 


MCHC  31.3    (31.0-37.0)  g/dL


 


RDW  17.3 H    (11.5-15.5)  %


 


Plt Count  66 L D    (150-450)  k/uL


 


MPV  9.3    


 


Neutrophils %  92    %


 


Lymphocytes %  3    %


 


Monocytes %  4    %


 


Eosinophils %  1    %


 


Basophils %  0    %


 


Neutrophils #  10.1 H    (1.3-7.7)  k/uL


 


Lymphocytes #  0.3 L    (1.0-4.8)  k/uL


 


Monocytes #  0.4    (0-1.0)  k/uL


 


Eosinophils #  0.1    (0-0.7)  k/uL


 


Basophils #  0.0    (0-0.2)  k/uL


 


Manual Slide Review  Performed    


 


Hypochromasia  Moderate    


 


Poikilocytosis  Slight    


 


Anisocytosis  Slight    


 


Macrocytosis  Slight    


 


PT   10.3   (9.0-12.0)  sec


 


INR   1.0   (<1.2)  


 


APTT   32.4 H   (22.0-30.0)  sec


 


Sodium    132 L  (137-145)  mmol/L


 


Potassium    5.8 H  (3.5-5.1)  mmol/L


 


Chloride    101  ()  mmol/L


 


Carbon Dioxide    21 L  (22-30)  mmol/L


 


Anion Gap    10  mmol/L


 


BUN    94 H  (7-17)  mg/dL


 


Creatinine    2.51 H  (0.52-1.04)  mg/dL


 


Est GFR (CKD-EPI)AfAm    21  (>60 ml/min/1.73 sqM)  


 


Est GFR (CKD-EPI)NonAf    18  (>60 ml/min/1.73 sqM)  


 


Glucose    93  (74-99)  mg/dL


 


Plasma Lactic Acid Andérs     (0.7-2.0)  mmol/L


 


Calcium    8.6  (8.4-10.2)  mg/dL


 


Total Bilirubin    0.6  (0.2-1.3)  mg/dL


 


AST    90 H  (14-36)  U/L


 


ALT    39 H  (4-34)  U/L


 


Alkaline Phosphatase    96  ()  U/L


 


Troponin I     (0.000-0.034)  ng/mL


 


NT-Pro-B Natriuret Pep    231  pg/mL


 


Total Protein    5.4 L  (6.3-8.2)  g/dL


 


Albumin    2.3 L  (3.5-5.0)  g/dL


 


Coronavirus (PCR)     (Not Detectd)  














  09/09/23 09/09/23 09/09/23 Range/Units





  13:33 13:33 14:10 


 


WBC     (3.8-10.6)  k/uL


 


RBC     (3.80-5.40)  m/uL


 


Hgb     (11.4-16.0)  gm/dL


 


Hct     (34.0-46.0)  %


 


MCV     (80.0-100.0)  fL


 


MCH     (25.0-35.0)  pg


 


MCHC     (31.0-37.0)  g/dL


 


RDW     (11.5-15.5)  %


 


Plt Count     (150-450)  k/uL


 


MPV     


 


Neutrophils %     %


 


Lymphocytes %     %


 


Monocytes %     %


 


Eosinophils %     %


 


Basophils %     %


 


Neutrophils #     (1.3-7.7)  k/uL


 


Lymphocytes #     (1.0-4.8)  k/uL


 


Monocytes #     (0-1.0)  k/uL


 


Eosinophils #     (0-0.7)  k/uL


 


Basophils #     (0-0.2)  k/uL


 


Manual Slide Review     


 


Hypochromasia     


 


Poikilocytosis     


 


Anisocytosis     


 


Macrocytosis     


 


PT     (9.0-12.0)  sec


 


INR     (<1.2)  


 


APTT     (22.0-30.0)  sec


 


Sodium     (137-145)  mmol/L


 


Potassium     (3.5-5.1)  mmol/L


 


Chloride     ()  mmol/L


 


Carbon Dioxide     (22-30)  mmol/L


 


Anion Gap     mmol/L


 


BUN     (7-17)  mg/dL


 


Creatinine     (0.52-1.04)  mg/dL


 


Est GFR (CKD-EPI)AfAm     (>60 ml/min/1.73 sqM)  


 


Est GFR (CKD-EPI)NonAf     (>60 ml/min/1.73 sqM)  


 


Glucose     (74-99)  mg/dL


 


Plasma Lactic Acid Andrés  1.4    (0.7-2.0)  mmol/L


 


Calcium     (8.4-10.2)  mg/dL


 


Total Bilirubin     (0.2-1.3)  mg/dL


 


AST     (14-36)  U/L


 


ALT     (4-34)  U/L


 


Alkaline Phosphatase     ()  U/L


 


Troponin I   <0.012   (0.000-0.034)  ng/mL


 


NT-Pro-B Natriuret Pep     pg/mL


 


Total Protein     (6.3-8.2)  g/dL


 


Albumin     (3.5-5.0)  g/dL


 


Coronavirus (PCR)    Not Detected  (Not Detectd)  














Disposition





<Marine Marte - Last Filed: 09/09/23 14:18>


Is patient prescribed a controlled substance at d/c from ED?: No


Time of Disposition: 15:22





<Sukhjinder Pryor - Last Filed: 09/09/23 15:22>


Clinical Impression: 


 ОЛЬГА (acute kidney injury), Aspiration pneumonia





Disposition: ADMITTED AS IP TO THIS HOSP


Condition: Poor


Referrals: 


Arya Levy MD [Primary Care Provider] - 1-2 days

## 2023-09-10 LAB
ANION GAP SERPL CALC-SCNC: 9 MMOL/L
BUN SERPL-SCNC: 99 MG/DL (ref 7–17)
CALCIUM SPEC-MCNC: 8.4 MG/DL (ref 8.4–10.2)
CHLORIDE SERPL-SCNC: 104 MMOL/L (ref 98–107)
CO2 SERPL-SCNC: 21 MMOL/L (ref 22–30)
ERYTHROCYTE [DISTWIDTH] IN BLOOD BY AUTOMATED COUNT: 3.48 M/UL (ref 3.8–5.4)
ERYTHROCYTE [DISTWIDTH] IN BLOOD: 17.6 % (ref 11.5–15.5)
GLUCOSE BLD-MCNC: 102 MG/DL (ref 70–110)
GLUCOSE BLD-MCNC: 103 MG/DL (ref 70–110)
GLUCOSE BLD-MCNC: 47 MG/DL (ref 70–110)
GLUCOSE BLD-MCNC: 76 MG/DL (ref 70–110)
GLUCOSE BLD-MCNC: 98 MG/DL (ref 70–110)
GLUCOSE SERPL-MCNC: 72 MG/DL (ref 74–99)
HCT VFR BLD AUTO: 33.9 % (ref 34–46)
HGB BLD-MCNC: 10.9 GM/DL (ref 11.4–16)
MCH RBC QN AUTO: 31.3 PG (ref 25–35)
MCHC RBC AUTO-ENTMCNC: 32.2 G/DL (ref 31–37)
MCV RBC AUTO: 97.3 FL (ref 80–100)
PLATELET # BLD AUTO: 48 K/UL (ref 150–450)
POTASSIUM SERPL-SCNC: 5.5 MMOL/L (ref 3.5–5.1)
SODIUM SERPL-SCNC: 134 MMOL/L (ref 137–145)
WBC # BLD AUTO: 12.1 K/UL (ref 3.8–10.6)

## 2023-09-10 RX ADMIN — MONTELUKAST SODIUM SCH: 10 TABLET, FILM COATED ORAL at 08:58

## 2023-09-10 RX ADMIN — LEVOTHYROXINE SODIUM SCH: 0.12 TABLET ORAL at 05:26

## 2023-09-10 RX ADMIN — CEFAZOLIN SCH MLS/HR: 330 INJECTION, POWDER, FOR SOLUTION INTRAMUSCULAR; INTRAVENOUS at 04:12

## 2023-09-10 RX ADMIN — DEXTROSE MONOHYDRATE AND SODIUM CHLORIDE SCH MLS/HR: 5; .9 INJECTION, SOLUTION INTRAVENOUS at 12:29

## 2023-09-10 RX ADMIN — LEVETIRACETAM SCH MG: 100 INJECTION, SOLUTION, CONCENTRATE INTRAVENOUS at 09:27

## 2023-09-10 RX ADMIN — LACOSAMIDE SCH MG: 10 INJECTION INTRAVENOUS at 20:43

## 2023-09-10 RX ADMIN — LEVETIRACETAM SCH MG: 100 INJECTION, SOLUTION, CONCENTRATE INTRAVENOUS at 20:43

## 2023-09-10 RX ADMIN — CINACALCET HYDROCHLORIDE SCH: 30 TABLET, COATED ORAL at 08:58

## 2023-09-10 RX ADMIN — MORPHINE SULFATE PRN MG: 2 INJECTION, SOLUTION INTRAMUSCULAR; INTRAVENOUS at 04:12

## 2023-09-10 RX ADMIN — PANTOPRAZOLE SODIUM SCH: 40 TABLET, DELAYED RELEASE ORAL at 05:26

## 2023-09-10 RX ADMIN — CEFAZOLIN SCH: 330 INJECTION, POWDER, FOR SOLUTION INTRAMUSCULAR; INTRAVENOUS at 09:05

## 2023-09-10 NOTE — P.CONS
History of Present Illness





- Reason for Consult


Consult date: 09/10/23





- History of Present Illness


Patient is a 78-year-old  female with a past medical history 

significant for seizure disorder hypothyroidism nursing home resident patient 

has been sent to the ER yesterday afternoon with concern for possible aspiration

from the nursing home patient did have a chronic diabetes bilateral temporal 

extremities patient with right lower leg patient currently denies having any 

headache or  URI symptoms no chest pain shortness of Viltepso occasional cough 

no nausea vomiting no abdominal pain or diarrhea did have a more swelling 

redness right lower extremity denies any history of any trauma or any drainage 

on presentation to the hospital the patient was afebrile and no fever has been r

ecorded subsequently patient did have a white count of 12.1 with a left shift 

creatinine is 2.57 potassium is 5.5 levels is mildly elevated CRP 17.5 patient 

did have a chest x-ray x2 that has been negative for any acute infiltrate blood 

cultures are growing Acinetobacter and MRSA patient has been on Levaquin because

of her penicillin allergy infectious disease was consulted for further 

management of antibiotic therapy most information has been obtained from review 

of the chart talking to the friends at the bedside patient has some elevated 

good historian








Past Medical History


Past Medical History: Seizure Disorder, Thyroid Disorder


History of Any Multi-Drug Resistant Organisms: None Reported


Past Surgical History: No Surgical Hx Reported


Additional Past Surgical History / Comment(s): cataract surgery, non-cancerous 

stomach growth removal


Past Psychological History: No Psychological Hx Reported


Smoking Status: Never smoker





Medications and Allergies


                                Home Medications











 Medication  Instructions  Recorded  Confirmed  Type


 


Levothyroxine Sodium 125 mcg PO DAILY@0600 08/20/21 09/09/23 History


 


Primidone 250 mg PO HS 08/20/21 09/09/23 History


 


Potassium Chloride 10 meq PO DAILY@0800 08/23/21 09/09/23 History


 


Cinacalcet [Sensipar] 30 mg PO DAILY@0800 08/06/23 09/09/23 History


 


Montelukast [Singulair] 10 mg PO DAILY@0800 08/06/23 09/09/23 History


 


Calamine/Zinc Oxide Lotion 1 applic TOPICAL TID PRN  each 08/15/23 09/09/23 Rx





[Calamine Lotion]    


 


Furosemide [Lasix] 40 mg PO BID@0900,1600  tab 08/15/23 09/09/23 Rx


 


Acetaminophen Tab [Tylenol] 650 mg PO Q4H PRN 09/09/23 09/09/23 History


 


Betamethasone Dipropionate 1 applic TOPICAL BID@0800,2100 09/09/23 09/09/23 H

istory





[Diprolene AF 0.05% Cream]    


 


Hydrocortisone Lotion [Hytone 2.5% 1 applic TOPICAL BID@0800,2100 09/09/23 09/09/23 History





Lotion]    


 


Magnesium Hydroxide [Milk of 7,200 mg PO Q48H PRN 09/09/23 09/09/23 History





Magnesia Concentrate]    


 


Na Phos,M-B/Na Phos,Di-Ba [Fleet 133 ml RECTAL DAILY PRN 09/09/23 09/09/23 

History





Adult]    


 


Pantoprazole [Protonix] 40 mg PO DAILY@0600 09/09/23 09/09/23 History


 


bisacodyL [Dulcolax] 10 mg RECTAL DAILY PRN 09/09/23 09/09/23 History


 


levETIRAcetam [Keppra] 1,000 mg PO Q12HR@0800,2100 09/09/23 09/09/23 History


 


metOLazone [Zaroxolyn] 2.5 mg PO DAILY@0600 09/09/23 09/09/23 History








                                    Allergies











Allergy/AdvReac Type Severity Reaction Status Date / Time


 


Penicillins Allergy  Rash/Hives Verified 09/09/23 15:51














Physical Exam


Vitals: 


                                   Vital Signs











  Temp Pulse Pulse Pulse Resp BP BP


 


 09/10/23 08:00     89  18   95/55


 


 09/10/23 00:50  97.7 F   67   19   137/77


 


 09/09/23 20:26       


 


 09/09/23 19:15  98.0 F   70   19   118/66


 


 09/09/23 17:01   74    20  96/58 


 


 09/09/23 14:34   67    18  102/50 














  Pulse Ox


 


 09/10/23 08:00  97


 


 09/10/23 00:50  97


 


 09/09/23 20:26  95


 


 09/09/23 19:15  95


 


 09/09/23 17:01  94 L


 


 09/09/23 14:34  97








                                Intake and Output











 09/09/23 09/10/23 09/10/23





 22:59 06:59 14:59


 


Other:   


 


  Voiding Method External Catheter  External Catheter


 


  # Voids 1 1 


 


  # Bowel Movements  1 


 


  Weight 117.934 kg  














Results


CBC & Chem 7: 


                                 09/10/23 07:06





                                 09/10/23 12:54


Labs: 


                  Abnormal Lab Results - Last 24 Hours (Table)











  09/09/23 09/09/23 09/09/23 Range/Units





  13:33 13:33 13:33 


 


WBC  11.0 H    (3.8-10.6)  k/uL


 


RBC  3.64 L    (3.80-5.40)  m/uL


 


Hgb  11.1 L    (11.4-16.0)  gm/dL


 


Hct     (34.0-46.0)  %


 


RDW  17.3 H    (11.5-15.5)  %


 


Plt Count  66 L D    (150-450)  k/uL


 


Neutrophils #  10.1 H    (1.3-7.7)  k/uL


 


Lymphocytes #  0.3 L    (1.0-4.8)  k/uL


 


APTT   32.4 H   (22.0-30.0)  sec


 


Sodium    132 L  (137-145)  mmol/L


 


Potassium    5.8 H  (3.5-5.1)  mmol/L


 


Carbon Dioxide    21 L  (22-30)  mmol/L


 


BUN    94 H  (7-17)  mg/dL


 


Creatinine    2.51 H  (0.52-1.04)  mg/dL


 


POC Glucose (mg/dL)     ()  mg/dL


 


AST    90 H  (14-36)  U/L


 


ALT    39 H  (4-34)  U/L


 


Total Protein    5.4 L  (6.3-8.2)  g/dL


 


Albumin    2.3 L  (3.5-5.0)  g/dL














  09/09/23 09/09/23 09/10/23 Range/Units





  20:58 22:49 07:06 


 


WBC    12.1 H  (3.8-10.6)  k/uL


 


RBC    3.48 L  (3.80-5.40)  m/uL


 


Hgb    10.9 L  (11.4-16.0)  gm/dL


 


Hct    33.9 L  (34.0-46.0)  %


 


RDW    17.6 H  (11.5-15.5)  %


 


Plt Count    48 L  (150-450)  k/uL


 


Neutrophils #     (1.3-7.7)  k/uL


 


Lymphocytes #     (1.0-4.8)  k/uL


 


APTT     (22.0-30.0)  sec


 


Sodium     (137-145)  mmol/L


 


Potassium   5.9 H   (3.5-5.1)  mmol/L


 


Carbon Dioxide     (22-30)  mmol/L


 


BUN     (7-17)  mg/dL


 


Creatinine     (0.52-1.04)  mg/dL


 


POC Glucose (mg/dL)  212 H    ()  mg/dL


 


AST     (14-36)  U/L


 


ALT     (4-34)  U/L


 


Total Protein     (6.3-8.2)  g/dL


 


Albumin     (3.5-5.0)  g/dL














  09/10/23 09/10/23 Range/Units





  09:14 09:15 


 


WBC    (3.8-10.6)  k/uL


 


RBC    (3.80-5.40)  m/uL


 


Hgb    (11.4-16.0)  gm/dL


 


Hct    (34.0-46.0)  %


 


RDW    (11.5-15.5)  %


 


Plt Count    (150-450)  k/uL


 


Neutrophils #    (1.3-7.7)  k/uL


 


Lymphocytes #    (1.0-4.8)  k/uL


 


APTT    (22.0-30.0)  sec


 


Sodium    (137-145)  mmol/L


 


Potassium    (3.5-5.1)  mmol/L


 


Carbon Dioxide    (22-30)  mmol/L


 


BUN    (7-17)  mg/dL


 


Creatinine    (0.52-1.04)  mg/dL


 


POC Glucose (mg/dL)  47 L  47 L  ()  mg/dL


 


AST    (14-36)  U/L


 


ALT    (4-34)  U/L


 


Total Protein    (6.3-8.2)  g/dL


 


Albumin    (3.5-5.0)  g/dL








                      Microbiology - Last 24 Hours (Table)











 09/09/23 13:20 Blood Culture Gram Stain - Preliminary





 Blood 


 


 09/09/23 13:05 Blood Culture Gram Stain - Preliminary





 Blood 














Assessment and Plan


Plan: 


1patient with the polymicrobial bacteremia source likely right lower extremity 

cellulitis as the patient had diffuse swelling redness and the patient is 

growing MRSA more likely skin soft tissue pathogen however the patient also 

growing Acinetobacter which is usually helpful urinary tract source and less 

likely pneumonia however the patient did have a chest x-ray x2 that has been 

negative for any pneumonia.


2patient with nebulization [place] limit the number of antibiotics safe to 

use.


3penicillin allergy regular blood work antibiotics safe to use.


4we will check UA and culture and also check CT abdominal pelvis with oral 

contrast only to rule out any intra-abdominal source.


5discontinue Levaquin.


6-start the patient on cefepime and daptomycin while awaiting further cultures 

to be finalized.


7blood cultures will be repeated document clearance of bacteremia.


We will follow on clinical condition and cultures to further adjust medication 

if needed


Thank you for this consultation we will follow the patient along with you


Dictation was produced using dragon dictation software. please excuse any 

grammatical, word or spelling errors. 





Time with Patient: Greater than 30

## 2023-09-10 NOTE — P.CNNES
History of Present Illness


Consult date: 09/10/23


Reason for Consult: Hx seizure disorder, acute MS changes


History of Present Illness: 





The pt is a 79 y/o female who is seen in neurologic consultation on Sept. 10, 

2023, in collaboration with Sabrina Torres, via teleneurolgy. 


The pt has a  past medical history significant for seizure disorder, chronic 

kidney disease, hyponatremia, hyperparathyroid, hypothyroidism presented to the 

emergency department with altered mentation.  Patient had recent hospitalization

last month with similar presentation and was noted to have breakthrough seizu

res.  She  was seen by Neurology.  At that time, the pt was started on Keppra 

100mg po bid.


Patient sent in from her nursing home due to concern for possible aspiration.  

She is a poor historian history was obtained from chart review and medical 

record.


Family members are present at the bedside at the time of the eval.


* 





Review of Systems





unable to obtain secondary to mental status of pt





Past Medical History


Past Medical History: Seizure Disorder, Thyroid Disorder


History of Any Multi-Drug Resistant Organisms: None Reported


Past Surgical History: No Surgical Hx Reported


Additional Past Surgical History / Comment(s): cataract surgery, non-cancerous s

tomach growth removal


Past Psychological History: No Psychological Hx Reported


Smoking Status: Never smoker





Medications and Allergies


                                Home Medications











 Medication  Instructions  Recorded  Confirmed  Type


 


Levothyroxine Sodium 125 mcg PO DAILY@0600 21 History


 


Primidone 250 mg PO HS 21 History


 


Potassium Chloride 10 meq PO DAILY@21 History


 


Cinacalcet [Sensipar] 30 mg PO DAILY@23 History


 


Montelukast [Singulair] 10 mg PO DAILY@23 History


 


Calamine/Zinc Oxide Lotion 1 applic TOPICAL TID PRN  each 08/15/23 09/09/23 Rx





[Calamine Lotion]    


 


Furosemide [Lasix] 40 mg PO BID@0900,1600  tab 08/15/23 09/09/23 Rx


 


Acetaminophen Tab [Tylenol] 650 mg PO Q4H PRN 23 History


 


Betamethasone Dipropionate 1 applic TOPICAL BID@0800,2100 23 

History





[Diprolene AF 0.05% Cream]    


 


Hydrocortisone Lotion [Hytone 2.5% 1 applic TOPICAL BID@0800,23 History





Lotion]    


 


Magnesium Hydroxide [Milk of 7,200 mg PO Q48H PRN 23 History





Magnesia Concentrate]    


 


Na Phos,M-B/Na Phos,Di-Ba [Fleet 133 ml RECTAL DAILY PRN 23 

History





Adult]    


 


Pantoprazole [Protonix] 40 mg PO DAILY@0600 23 History


 


bisacodyL [Dulcolax] 10 mg RECTAL DAILY PRN 23 History


 


levETIRAcetam [Keppra] 1,000 mg PO Q12HR@0800,23 History


 


metOLazone [Zaroxolyn] 2.5 mg PO DAILY@23 History








                                    Allergies











Allergy/AdvReac Type Severity Reaction Status Date / Time


 


Penicillins Allergy  Rash/Hives Verified 23 15:51














Physical Examination





- Vital Signs


Vital Signs: 


                                   Vital Signs











  Temp Pulse Pulse Pulse Resp BP BP


 


 09/10/23 08:00     89  18   95/55


 


 09/10/23 00:50  97.7 F   67   19   137/77


 


 23 20:26       


 


 23 19:15  98.0 F   70   19   118/66


 


 23 17:01   74    20  96/58 


 


 23 14:34   67    18  102/50 


 


 23 11:59   71    22  107/61 














  Pulse Ox


 


 09/10/23 08:00  97


 


 09/10/23 00:50  97


 


 23 20:26  95


 


 23 19:15  95


 


 23 17:01  94 L


 


 23 14:34  97


 


 23 11:59  95








                                Intake and Output











 09/09/23 09/10/23 09/10/23





 22:59 06:59 14:59


 


Other:   


 


  Voiding Method External Catheter  External Catheter


 


  # Voids 1 1 


 


  # Bowel Movements  1 


 


  Weight 117.934 kg  














Vitals:  HR 78-95/52-O2 sat 95%


General:  The pt is reclining in the bed.  She is obese.  She is in mild 

distress.


HEENT:  Atraumatic, normocephalic. There is no scleral icterus.  Fundus not 

visualized.  Mucous membranes moist


Neck:  Supple, without carotid bruits


Heart:  Difficult to auscultate secondary to lung sounds


Lungs:  Wheezing throughout


Extremities:  Erythema, blisters, rash and edema of all extremities


Neurological examination


Mental status:  The pt is sleepy, but arousable.  She is able to state her first

 and last name,  and location. Her speech is hypophonic, raspy.


Cranial Nerves:  Pupils are equal at 2mm and reactive.  There is a right gaze 

preference.  Extraocular muscles are intact.  There is no facial asymmetry.  

Hearing is grossly intact.  Tongue protrudes midline, without bite.


Motor:  Bilateral  strength 4/5.  The pt is able to wiggle toes of right 

foot, not left.  Abnormal shaking movements of right leg, head and torso


Sensation:  The pt withdraws form noxious stim.


Coordination:  Unable to be assessed.


Deep Tendon reflexes:  Not assessed at this time


Gait:  Not assessed 





Results





- Laboratory Findings


CBC and BMP: 


                                 09/10/23 07:06





                                 09/10/23 12:54


Abnormal Lab Findings: 


                                  Abnormal Labs











  23





  13:33 13:33 13:33


 


WBC  11.0 H  


 


RBC  3.64 L  


 


Hgb  11.1 L  


 


Hct   


 


RDW  17.3 H  


 


Plt Count  66 L D  


 


Neutrophils #  10.1 H  


 


Lymphocytes #  0.3 L  


 


APTT   32.4 H 


 


Sodium    132 L


 


Potassium    5.8 H


 


Carbon Dioxide    21 L


 


BUN    94 H


 


Creatinine    2.51 H


 


POC Glucose (mg/dL)   


 


AST    90 H


 


ALT    39 H


 


Total Protein    5.4 L


 


Albumin    2.3 L














  09/09/23 09/09/23 09/10/23





  20:58 22:49 07:06


 


WBC    12.1 H


 


RBC    3.48 L


 


Hgb    10.9 L


 


Hct    33.9 L


 


RDW    17.6 H


 


Plt Count    48 L


 


Neutrophils #   


 


Lymphocytes #   


 


APTT   


 


Sodium   


 


Potassium   5.9 H 


 


Carbon Dioxide   


 


BUN   


 


Creatinine   


 


POC Glucose (mg/dL)  212 H  


 


AST   


 


ALT   


 


Total Protein   


 


Albumin   














  09/10/23 09/10/23





  09:14 09:15


 


WBC  


 


RBC  


 


Hgb  


 


Hct  


 


RDW  


 


Plt Count  


 


Neutrophils #  


 


Lymphocytes #  


 


APTT  


 


Sodium  


 


Potassium  


 


Carbon Dioxide  


 


BUN  


 


Creatinine  


 


POC Glucose (mg/dL)  47 L  47 L


 


AST  


 


ALT  


 


Total Protein  


 


Albumin  














Assessment and Plan


Assessment: 





1. Break through seizure in pt with history of seizure disorder and current 

infection/sepsis


2. 


Plan: 





1. Vimpat loading dose of 200mg IVPB has been ordered


2. Vimpat maintenance 100mg IV bid


3. Stat keppra level ordered


4. Seizure precautions


5. Ativan for break thru seizure


6. EEG





Thank you for allowing us to participate in the care of this pt.


Dr. Rogers Nolen will assume neurologic coverage of this pt as of 2023





Time with Patient: Greater than 30 (45 minutes were spent caring for this pt 

today, including obatining a history, examining the pt, reviewing imaging, labs,

 chart documentation, placing orders and creating this note)

## 2023-09-10 NOTE — P.HPIM
History of Present Illness


H&P Date: 09/10/23


Chief Complaint:  altered mentation





* 78-year-old lady with past medical history significant for seizure disorder, 

  chronic kidney disease, hyponatremia, hyperparathyroid, hypothyroidism 

  presented to the emergency department with altered mentation.  Patient had 

  recent hospitalization last month with similar presentation and was noted to 

  have breakthrough seizures was seen by Neurology.  Patient sent in from 

  facility due to concern for possible aspiration.  She is a nursing home 

  resident and a poor historian history was obtained from chart review and 

  medical record


* Review of blood work at the time of presentation showed leukocytosis, WBC 11, 

  hemoglobin 11 platelet count 66.  Sodium 132 potassium 5.9 chloride 21 BUN 94 

  creatinine 2.51.


* A chest x-ray obtained on admission was negative for acute intrathoracic 

  process. 


*  Patient was started on IV antibiotic and IV fluid and admitted on medical 

  floor for further management


* Upon evaluation patient was alert to person and does follow commands however 

  does appear lethargic.  Does not know her baseline.  She does appear fluid 

  overload





REVIEW OF SYSTEMS: Patient complains of Generalized pain, review of system 

limited secondary to mentation








PHYSICAL EXAMINATION: 





GENERAL: The patient is alert and oriented x1, ill appearance, lethargic however

easily arousable, obese


HEENT: Pupils are round and equally reacting to light. EOMI. N. Normocephalic


CARDIOVASCULAR: S1 and S2 present.  Lower extremity edema present


PULMONARY: Chest is clear to auscultation, no wheezing or crackles. 


ABDOMEN: Soft, nontender, nondistended, normoactive bowel sounds. No palpable 

organomegaly. 


MUSCULOSKELETAL: No joint swelling or deformity.


EXTREMITIES: No cyanosis, clubbing, or pedal edema. 


NEUROLOGICAL: Gross neurological examination did not reveal any focal deficits. 


SKIN: Right lower extremity erythema noted








Past Medical History


Past Medical History: Seizure Disorder, Thyroid Disorder


History of Any Multi-Drug Resistant Organisms: None Reported


Past Surgical History: No Surgical Hx Reported


Additional Past Surgical History / Comment(s): cataract surgery, non-cancerous 

stomach growth removal


Past Psychological History: No Psychological Hx Reported


Smoking Status: Never smoker





Medications and Allergies


                                Home Medications











 Medication  Instructions  Recorded  Confirmed  Type


 


Levothyroxine Sodium 125 mcg PO DAILY@0600 08/20/21 09/09/23 History


 


Primidone 250 mg PO HS 08/20/21 09/09/23 History


 


Potassium Chloride 10 meq PO DAILY@0800 08/23/21 09/09/23 History


 


Cinacalcet [Sensipar] 30 mg PO DAILY@0800 08/06/23 09/09/23 History


 


Montelukast [Singulair] 10 mg PO DAILY@0800 08/06/23 09/09/23 History


 


Calamine/Zinc Oxide Lotion 1 applic TOPICAL TID PRN  each 08/15/23 09/09/23 Rx





[Calamine Lotion]    


 


Furosemide [Lasix] 40 mg PO BID@0900,1600  tab 08/15/23 09/09/23 Rx


 


Acetaminophen Tab [Tylenol] 650 mg PO Q4H PRN 09/09/23 09/09/23 History


 


Betamethasone Dipropionate 1 applic TOPICAL BID@0800,2100 09/09/23 09/09/23 

History





[Diprolene AF 0.05% Cream]    


 


Hydrocortisone Lotion [Hytone 2.5% 1 applic TOPICAL BID@0800,2100 09/09/23 09/09/23 History





Lotion]    


 


Magnesium Hydroxide [Milk of 7,200 mg PO Q48H PRN 09/09/23 09/09/23 History





Magnesia Concentrate]    


 


Na Phos,M-B/Na Phos,Di-Ba [Fleet 133 ml RECTAL DAILY PRN 09/09/23 09/09/23 

History





Adult]    


 


Pantoprazole [Protonix] 40 mg PO DAILY@0600 09/09/23 09/09/23 History


 


bisacodyL [Dulcolax] 10 mg RECTAL DAILY PRN 09/09/23 09/09/23 History


 


levETIRAcetam [Keppra] 1,000 mg PO Q12HR@0800,2100 09/09/23 09/09/23 History


 


metOLazone [Zaroxolyn] 2.5 mg PO DAILY@0600 09/09/23 09/09/23 History








                                    Allergies











Allergy/AdvReac Type Severity Reaction Status Date / Time


 


Penicillins Allergy  Rash/Hives Verified 09/09/23 15:51














Physical Exam


Vitals: 


                                   Vital Signs











  Temp Pulse Pulse Resp BP BP Pulse Ox


 


 09/10/23 00:50  97.7 F   67  19   137/77  97


 


 09/09/23 20:26        95


 


 09/09/23 19:15  98.0 F   70  19   118/66  95


 


 09/09/23 17:01   74   20  96/58   94 L


 


 09/09/23 14:34   67   18  102/50   97


 


 09/09/23 11:59   71   22  107/61   95








                                Intake and Output











 09/09/23 09/09/23 09/10/23





 14:59 22:59 06:59


 


Other:   


 


  Voiding Method  External Catheter 


 


  # Voids  1 


 


  Weight 117.934 kg 117.934 kg 














Results


CBC & Chem 7: 


                                 09/10/23 07:06





                                 09/09/23 22:49


Labs: 


                  Abnormal Lab Results - Last 24 Hours (Table)











  09/09/23 09/09/23 09/09/23 Range/Units





  13:33 13:33 13:33 


 


WBC  11.0 H    (3.8-10.6)  k/uL


 


RBC  3.64 L    (3.80-5.40)  m/uL


 


Hgb  11.1 L    (11.4-16.0)  gm/dL


 


RDW  17.3 H    (11.5-15.5)  %


 


Plt Count  66 L D    (150-450)  k/uL


 


Neutrophils #  10.1 H    (1.3-7.7)  k/uL


 


Lymphocytes #  0.3 L    (1.0-4.8)  k/uL


 


APTT   32.4 H   (22.0-30.0)  sec


 


Sodium    132 L  (137-145)  mmol/L


 


Potassium    5.8 H  (3.5-5.1)  mmol/L


 


Carbon Dioxide    21 L  (22-30)  mmol/L


 


BUN    94 H  (7-17)  mg/dL


 


Creatinine    2.51 H  (0.52-1.04)  mg/dL


 


POC Glucose (mg/dL)     ()  mg/dL


 


AST    90 H  (14-36)  U/L


 


ALT    39 H  (4-34)  U/L


 


Total Protein    5.4 L  (6.3-8.2)  g/dL


 


Albumin    2.3 L  (3.5-5.0)  g/dL














  09/09/23 09/09/23 Range/Units





  20:58 22:49 


 


WBC    (3.8-10.6)  k/uL


 


RBC    (3.80-5.40)  m/uL


 


Hgb    (11.4-16.0)  gm/dL


 


RDW    (11.5-15.5)  %


 


Plt Count    (150-450)  k/uL


 


Neutrophils #    (1.3-7.7)  k/uL


 


Lymphocytes #    (1.0-4.8)  k/uL


 


APTT    (22.0-30.0)  sec


 


Sodium    (137-145)  mmol/L


 


Potassium   5.9 H  (3.5-5.1)  mmol/L


 


Carbon Dioxide    (22-30)  mmol/L


 


BUN    (7-17)  mg/dL


 


Creatinine    (0.52-1.04)  mg/dL


 


POC Glucose (mg/dL)  212 H   ()  mg/dL


 


AST    (14-36)  U/L


 


ALT    (4-34)  U/L


 


Total Protein    (6.3-8.2)  g/dL


 


Albumin    (3.5-5.0)  g/dL














Assessment and Plan


Assessment: 





Assessment and plan





*  aspiration pneumonia


*  acute renal failure with hyperkalemia with chronic kidney disease


*  chronic hyponatremia


* Right lotion with the cellulitis


*  acute on chronic encephalopathy metabolic in origin


*  history of seizure disorder


*  failure to thrive





*  in regards to aspiration pneumonia patient started on IV antibiotics, 

  maintain aspiration precautions, speech therapy consulted patient NPO till 

  place speech evaluation


*  in regards to renal failure and hyperkalemia patient given insulin, dextrose,

   Lokelma full follow-up on potassium levels nephrology consulted


*  in regards to history of seizure disorder, and worsening mentation, Neurology

   consulted


* In regards to underlying pneumonia and cellulitis, requested infectious 

  disease input


*  in regards to failure to thrive patient will need physical therapy, 

  occupational therapy evaluation


*  in regards to hyponatremia continue to monitor serum sodium levels post 

  hydration.  Continue to hold Lasix and potassium supplementation secondary to 

  worsening renal function and hyperkalemia


* CODE STATUS is full code

## 2023-09-10 NOTE — XR
EXAMINATION TYPE: XR chest 1V portable

 

DATE OF EXAM: 9/10/2023

 

COMPARISON: 9/9/2023

 

INDICATION: Pneumonia, cough

 

TECHNIQUE: Single frontal view of the chest is obtained.

 

FINDINGS:  

The heart size is normal.  

The pulmonary vasculature is normal.  

No suspicious focal consolidation is evident. Findings appear stable.  

 

 

IMPRESSION:  

1. No acute pulmonary process. Follow-up as clinically indicated

## 2023-09-11 ENCOUNTER — HOSPITAL ENCOUNTER (INPATIENT)
Dept: HOSPITAL 47 - 4SSUR | Age: 78
LOS: 4 days | DRG: 951 | End: 2023-09-15
Attending: INTERNAL MEDICINE | Admitting: INTERNAL MEDICINE
Payer: MEDICAID

## 2023-09-11 VITALS
HEART RATE: 84 BPM | DIASTOLIC BLOOD PRESSURE: 48 MMHG | SYSTOLIC BLOOD PRESSURE: 107 MMHG | TEMPERATURE: 97.4 F | RESPIRATION RATE: 19 BRPM

## 2023-09-11 DIAGNOSIS — E87.1: ICD-10-CM

## 2023-09-11 DIAGNOSIS — E21.3: ICD-10-CM

## 2023-09-11 DIAGNOSIS — E03.9: ICD-10-CM

## 2023-09-11 DIAGNOSIS — N17.9: ICD-10-CM

## 2023-09-11 DIAGNOSIS — J69.0: ICD-10-CM

## 2023-09-11 DIAGNOSIS — L03.314: ICD-10-CM

## 2023-09-11 DIAGNOSIS — E87.5: ICD-10-CM

## 2023-09-11 DIAGNOSIS — R62.7: ICD-10-CM

## 2023-09-11 DIAGNOSIS — Z66: ICD-10-CM

## 2023-09-11 DIAGNOSIS — Z51.5: Primary | ICD-10-CM

## 2023-09-11 DIAGNOSIS — E87.70: ICD-10-CM

## 2023-09-11 DIAGNOSIS — G93.41: ICD-10-CM

## 2023-09-11 DIAGNOSIS — N18.9: ICD-10-CM

## 2023-09-11 DIAGNOSIS — G40.909: ICD-10-CM

## 2023-09-11 LAB
ANION GAP SERPL CALC-SCNC: 8 MMOL/L
BUN SERPL-SCNC: 104 MG/DL (ref 7–17)
CALCIUM SPEC-MCNC: 8.2 MG/DL (ref 8.4–10.2)
CHLORIDE SERPL-SCNC: 106 MMOL/L (ref 98–107)
CO2 SERPL-SCNC: 21 MMOL/L (ref 22–30)
ERYTHROCYTE [DISTWIDTH] IN BLOOD BY AUTOMATED COUNT: 3.43 M/UL (ref 3.8–5.4)
ERYTHROCYTE [DISTWIDTH] IN BLOOD: 17.6 % (ref 11.5–15.5)
GLUCOSE BLD-MCNC: 110 MG/DL (ref 70–110)
GLUCOSE BLD-MCNC: 130 MG/DL (ref 70–110)
GLUCOSE SERPL-MCNC: 111 MG/DL (ref 74–99)
HCT VFR BLD AUTO: 33.4 % (ref 34–46)
HGB BLD-MCNC: 10.4 GM/DL (ref 11.4–16)
MCH RBC QN AUTO: 30.4 PG (ref 25–35)
MCHC RBC AUTO-ENTMCNC: 31.3 G/DL (ref 31–37)
MCV RBC AUTO: 97.1 FL (ref 80–100)
PH UR: 5 [PH] (ref 5–8)
PLATELET # BLD AUTO: 54 K/UL (ref 150–450)
POTASSIUM SERPL-SCNC: 5.3 MMOL/L (ref 3.5–5.1)
SODIUM SERPL-SCNC: 135 MMOL/L (ref 137–145)
SP GR UR: 1.02 (ref 1–1.03)
UROBILINOGEN UR QL STRIP: <2 MG/DL (ref ?–2)
WBC # BLD AUTO: 8.2 K/UL (ref 3.8–10.6)

## 2023-09-11 PROCEDURE — 4A10X4Z MONITORING OF CENTRAL NERVOUS ELECTRICAL ACTIVITY, EXTERNAL APPROACH: ICD-10-PCS

## 2023-09-11 RX ADMIN — MORPHINE SULFATE SCH MLS/HR: 50 INJECTION, SOLUTION, CONCENTRATE INTRAVENOUS at 17:48

## 2023-09-11 RX ADMIN — LEVETIRACETAM SCH MG: 100 INJECTION, SOLUTION, CONCENTRATE INTRAVENOUS at 21:03

## 2023-09-11 RX ADMIN — LEVETIRACETAM SCH MG: 100 INJECTION, SOLUTION, CONCENTRATE INTRAVENOUS at 09:47

## 2023-09-11 RX ADMIN — MORPHINE SULFATE PRN MG: 2 INJECTION, SOLUTION INTRAMUSCULAR; INTRAVENOUS at 14:56

## 2023-09-11 RX ADMIN — PANTOPRAZOLE SODIUM SCH: 40 TABLET, DELAYED RELEASE ORAL at 05:24

## 2023-09-11 RX ADMIN — LACOSAMIDE SCH MG: 10 INJECTION INTRAVENOUS at 21:03

## 2023-09-11 RX ADMIN — CEFAZOLIN SCH: 330 INJECTION, POWDER, FOR SOLUTION INTRAMUSCULAR; INTRAVENOUS at 17:47

## 2023-09-11 RX ADMIN — DEXTROSE MONOHYDRATE AND SODIUM CHLORIDE SCH MLS/HR: 5; .9 INJECTION, SOLUTION INTRAVENOUS at 11:35

## 2023-09-11 RX ADMIN — LACOSAMIDE SCH MG: 10 INJECTION INTRAVENOUS at 09:35

## 2023-09-11 RX ADMIN — LEVOTHYROXINE SODIUM SCH: 0.12 TABLET ORAL at 05:24

## 2023-09-11 RX ADMIN — CINACALCET HYDROCHLORIDE SCH: 30 TABLET, COATED ORAL at 09:23

## 2023-09-11 RX ADMIN — MONTELUKAST SODIUM SCH: 10 TABLET, FILM COATED ORAL at 09:23

## 2023-09-11 RX ADMIN — PRIMIDONE SCH: 250 TABLET ORAL at 01:27

## 2023-09-11 NOTE — P.PN
Subjective


Progress Note Date: 09/11/23


month with similar presentation and was noted to have breakthrough seizures was 

seen by Neurology.  Patient sent in from facility due to concern for possible 

aspiration.  She is a nursing home resident and a poor historian history was 

obtained from chart review and medical record


* Review of blood work at the time of presentation showed leukocytosis, WBC 11, 

  hemoglobin 11 platelet count 66.  Sodium 132 potassium 5.9 chloride 21 BUN 94 

  creatinine 2.51.


* A chest x-ray obtained on admission was negative for acute intrathoracic 

  process. 


*  Patient was started on IV antibiotic and IV fluid and admitted on medical 

  floor for further management


* Upon evaluation patient was alert to person and does follow commands however 

  does appear lethargic.  Does not know her baseline.  She does appear fluid 

  overload





9/11.  Patient seen and examined.  Patient is alert to self, opened eyes and 

follows commands.  CODE STATUS changed to DO NOT RESUSCITATE limited after 

discussion with guardian.  Hospice also consulted





REVIEW OF SYSTEMS: 


Review of systems unobtainable the patient mental status





PHYSICAL EXAMINATION: 





GENERAL: The patient is alert, not in any acute distress.  Chronically ill-

looking, poor oral hygiene


HEENT: Pupils are round and equally reacting to light. EOMI. No scleral icterus.

No conjunctival pallor. Normocephalic, atraumatic. No pharyngeal erythema. No 

thyromegaly. 


CARDIOVASCULAR: S1 and S2 present. No murmurs, rubs, or gallops. 


PULMONARY: Coarse breath sounds bilaterally, no wheeze


ABDOMEN: Soft, nontender, nondistended, normoactive bowel sounds. No palpable o

rganomegaly. 


MUSCULOSKELETAL: No joint swelling or deformity.


EXTREMITIES: 1+ pitting edema lower extremities bilaterally


NEUROLOGICAL: Gross neurological examination did not reveal any focal deficits. 


SKIN: No rashes. 





Assessment and plan


 aspiration pneumonia


*  acute renal failure with hyperkalemia with chronic kidney disease


*  chronic hyponatremia


* Right lotion with the cellulitis


*  acute on chronic encephalopathy metabolic in origin


*  history of seizure disorder


*  failure to thrive





Monitor vital signs


Monitor CBC


Monitor CMP


Continue telemetry monitoring


Follow-up on blood cultures


Aspiration precautions


Continue cefepime and daptomycin


EEG ordered


Continue Vimpat, continue Keppra


Neurology following


ID consulted


Nephrology consulted 


Hospice consulted


CODE STATUS changed to DO NOT RESUSCITATE limited





Labs and medication were reviewed..  Continue same treatment.  Continue with 

symptomatic treatment.  Resume home medication.  Monitor labs and vitals.  DVT 

and GI prophylaxis.  Further recommendations as per clinical course of the 

patient








Dictation was produced using dragon dictation software. please excuse any 

grammatical, word or spelling errors. 








Objective





- Vital Signs


Vital signs: 


                                   Vital Signs











Temp  97.3 F L  09/11/23 07:03


 


Pulse  88   09/11/23 07:03


 


Resp  23   09/11/23 07:03


 


BP  107/59   09/11/23 07:03


 


Pulse Ox  96   09/11/23 08:55


 


FiO2      








                                 Intake & Output











 09/10/23 09/11/23 09/11/23





 18:59 06:59 18:59


 


Intake Total 500  


 


Output Total 400  


 


Balance 100  


 


Intake:   


 


  Intake, IV Titration 500  





  Amount   


 


    Cefepime 2 gm In Sodium 100  





    Chloride 0.9% 100 ml @   





    200 mls/hr IVPB ONCE STA   





    Rx#:[id number]   


 


    DAPTOmycin 500 mg In 50  





    Sodium Chloride 0.9% 50   





    ml @ 100 mls/hr IVPB Q24H   





    [facility] Rx#:[id number]   


 


    Dextrose 5%-0.9% NaCl 1, 100  





    000 ml @ 20 mls/hr IV .   





    Q24H [facility] Rx#:[id number]   


 


    Sodium Chloride 0.9% 1, 250  





    000 ml @ 130 mls/hr IV .   





    Q7H42M [facility] Rx#:[id number]   


 


Output:   


 


  Urine 400  


 


Other:   


 


  Voiding Method External Catheter External Catheter 


 


  # Voids  1 


 


  # Bowel Movements 0  














- Labs


CBC & Chem 7: 


                                 09/11/23 06:45





                                 09/11/23 06:45


Labs: 


                  Abnormal Lab Results - Last 24 Hours (Table)











  09/10/23 09/10/23 09/11/23 Range/Units





  12:54 12:54 06:06 


 


RBC     (3.80-5.40)  m/uL


 


Hgb     (11.4-16.0)  gm/dL


 


Hct     (34.0-46.0)  %


 


RDW     (11.5-15.5)  %


 


Plt Count     (150-450)  k/uL


 


Sodium   134 L   (137-145)  mmol/L


 


Potassium  5.5 H  5.5 H   (3.5-5.1)  mmol/L


 


Carbon Dioxide   21 L   (22-30)  mmol/L


 


BUN   99 H   (7-17)  mg/dL


 


Creatinine   2.57 H   (0.52-1.04)  mg/dL


 


Glucose   72 L   (74-99)  mg/dL


 


POC Glucose (mg/dL)    130 H  ()  mg/dL


 


Calcium     (8.4-10.2)  mg/dL


 


C-Reactive Protein   17.5 H   (<1.0)  mg/dL














  09/11/23 09/11/23 Range/Units





  06:45 06:45 


 


RBC  3.43 L   (3.80-5.40)  m/uL


 


Hgb  10.4 L   (11.4-16.0)  gm/dL


 


Hct  33.4 L   (34.0-46.0)  %


 


RDW  17.6 H   (11.5-15.5)  %


 


Plt Count  54 L   (150-450)  k/uL


 


Sodium   135 L  (137-145)  mmol/L


 


Potassium   5.3 H  (3.5-5.1)  mmol/L


 


Carbon Dioxide   21 L  (22-30)  mmol/L


 


BUN   104 H*  (7-17)  mg/dL


 


Creatinine   2.33 H  (0.52-1.04)  mg/dL


 


Glucose   111 H  (74-99)  mg/dL


 


POC Glucose (mg/dL)    ()  mg/dL


 


Calcium   8.2 L  (8.4-10.2)  mg/dL


 


C-Reactive Protein   8.4 H  (<1.0)  mg/dL








                      Microbiology - Last 24 Hours (Table)











 09/09/23 13:20 Blood Culture Gram Stain - Preliminary





 Blood 


 


 09/09/23 13:05 Blood Culture Gram Stain - Preliminary





 Blood

## 2023-09-11 NOTE — EEG
ELECTROENCEPHALOGRAM REPORT



CLINICAL HISTORY:

This is a 78-year-old woman with reported breakthrough seizure.  The video EEG is

obtained to evaluate for seizure epileptiform activity.



RELEVANT MEDICATIONS:

Keppra and Vimpat.



EEG TYPE:

A routine 21-channel EEG performed with video using the 10/20 electrode placement

system.



DESCRIPTION:

Background consists of low-to-moderate voltage of 3 hertz.  At times, the background

consists of 5-6 hertz.  There was no physiological stage 2 sleep architecture.



There is no focal slowing.



Interictal and ictal is, during the study, the patient had multiple episodes of head

tremor as well as right foot tremor.  Electrographically, the patient had occipital

delta rhythmicity lasting 5 to 9 seconds.  An example is at 3 minutes and 13 seconds of

the study, the patient had 3 hertz moderate voltage of delta rhythmicity without any

evolution or any epileptiform discharges.  The patient had continuous head tremoring

and after resolution of the head tremor, the delta rhythmicity resolved.  Another

example is at 4 minutes and 5 seconds of the recording, the patient also had delta

rhythmicity over the occipital region that lasted less than 10 seconds and resolved

after the head tremor stopped.  Another incidence is the patient's 11 minutes and 17

seconds, the patient had uncontrolled head tremor and right foot tremor and the patient

had semi-rhythmic to nonrhythmic delta theta activity mostly in the posterior region

and the episode lasted about 10 seconds.  These episodes are not suggestive of

electrographic seizures.



ACTIVATION PROCEDURE:

Photic stimulation and hyperventilation are not performed.



CLINICAL INTERPRETATION:

This is an abnormal routine EEG.  The background slowing is suggestive of moderate-to-

severe encephalopathy.  No clear electrographic seizures.  The patient's head tremor

and right foot tremor did not seem suggestive of seizures.  Clinical correlation is

recommended.





MMODL / IJN: [id number] / Job#: [id number]

## 2023-09-11 NOTE — P.PN
Progress Note - Text


Progress Note Date: 09/11/23


Please refer to Dr. Lopez's notes for further details.  I spoke with primary 

attending and her nurse and they notified me that she is hospice.

## 2023-09-11 NOTE — CT
EXAMINATION TYPE: CT abdomen pelvis wo con

CT DLP: 1891.2 mGycm, Automated exposure control for dose reduction was used.

 

DATE OF EXAM: 9/11/2023 1:25 PM

 

COMPARISON: CT abdomen pelvis most recent from 8/7/2023.

CLINICAL INDICATION:Female, 78 years old with history of bacteremia; Bacteremia.

 

TECHNIQUE:  Standard  CT of the abdomen and pelvis without IV or oral contrast. Lack of IV or oral co
ntrast limits evaluation of solid and hollow organ viscera. Coronal and sagittal reformats were perfo
rmed. 

 

FINDINGS: 

LOWER CHEST: Mild cardiomegaly. No pericardial effusion. Bibasilar dependent subsequent atelectasis.

 

ABDOMEN

LIVER: Unremarkable noncontrast appearance

GALLBLADDER AND BILE DUCTS: Unremarkable noncontrast appearance

PANCREAS: Unremarkable noncontrast appearance

SPLEEN: Unremarkable noncontrast appearance

ADRENAL GLANDS: Unremarkable noncontrast appearance.

KIDNEYS AND URETERS: No evidence of hydronephrosis or renal calculus. Perinephric fluid collections.

 

PELVIS

BLADDER: Nondistended with Trujillo catheter in place. Nondependent foci of gas likely from catheter vishnu
cement.

REPRODUCTIVE: Unremarkable noncontrast appearance

 

ABDOMEN & PELVIS

STOMACH AND BOWEL: Suspected postsurgical changes of the stomach with surgical clips redemonstrated. 
Distal colonic diverticulosis without evidence for acute diverticulitis. The appendix is within edgar
l limits. No evidence of bowel obstruction. 

PERITONEUM: No evidence of pneumoperitoneum or free fluid.

 

VASCULATURE: No evidence of aortic aneurysm. 

MUSCULOSKELETAL: No acute osseous abnormalities. Findings compatible with DISH involving the lower th
oracic spine.

LYMPH NODES: No gross evidence for lymphadenopathy.

SOFT TISSUE/ABDOMINAL WALL: Small fat filled umbilical hernia.

 

IMPRESSION:

No acute abdominal/pelvic process.

## 2023-09-11 NOTE — US
EXAMINATION TYPE: US kidneys/renal and bladder

 

DATE OF EXAM: 9/11/2023

 

COMPARISON: NONE

 

CLINICAL INDICATION: Female, 78 years old with history of Nora; nora limited due to body habitus and shad
wel gas

 

EXAM MEASUREMENTS:

 

Right Kidney:  9.1 x 5.2 x 3.9  cm

Left Kidney: 89 x 4.8 x 4.2  cm

 

Incidental finding gallstone 1.7 cm seen.

 

Right Kidney: No hydronephrosis or masses seen  

Left Kidney: No hydronephrosis or masses seen  

Bladder: not visualized

**Bilateral Jets seen: No

 

There is no evidence for hydronephrosis at this point in time.  No nephrolithiasis is seen.  No denia
s are identified.  The urinary bladder is anechoic.  Bilateral ureteral jets are seen.

 

 

 

IMPRESSION:

Mild renal atrophic change. Incidental cholelithiasis.

## 2023-09-12 RX ADMIN — LEVETIRACETAM SCH MG: 100 INJECTION, SOLUTION, CONCENTRATE INTRAVENOUS at 22:25

## 2023-09-12 RX ADMIN — MORPHINE SULFATE SCH: 50 INJECTION, SOLUTION, CONCENTRATE INTRAVENOUS at 15:36

## 2023-09-12 RX ADMIN — ATROPINE SULFATE PRN DROPS: 10 SOLUTION/ DROPS OPHTHALMIC at 11:00

## 2023-09-12 RX ADMIN — LACOSAMIDE SCH MG: 10 INJECTION INTRAVENOUS at 09:12

## 2023-09-12 RX ADMIN — LACOSAMIDE SCH MG: 10 INJECTION INTRAVENOUS at 22:24

## 2023-09-12 RX ADMIN — LEVETIRACETAM SCH MG: 100 INJECTION, SOLUTION, CONCENTRATE INTRAVENOUS at 09:12

## 2023-09-12 RX ADMIN — GLYCOPYRROLATE PRN MG: 0.2 INJECTION, SOLUTION INTRAMUSCULAR; INTRAVENOUS at 12:51

## 2023-09-12 RX ADMIN — CEFAZOLIN SCH MLS/HR: 330 INJECTION, POWDER, FOR SOLUTION INTRAMUSCULAR; INTRAVENOUS at 16:05

## 2023-09-12 NOTE — P.DS
Providers


Date of admission: 


09/09/23 15:18





Expected date of discharge: 09/11/23


Attending physician: 


Sil Begum MD





Consults: 





                                        





09/09/23 15:17


Consult Physician Routine 


   Consulting Provider: Morena Lopez


   Consult Reason/Comments: lowell


   Do you want consulting provider notified?: Yes





09/09/23 21:37


Consult Physician Routine 


   Consulting Provider: Alaina Burrows


   Consult Reason/Comments: Hx of Seziure,with AMS, Eval for recurrent seziure


   Do you want consulting provider notified?: Yes





09/10/23 11:40


Consult Physician Routine 


   Consulting Provider: Alyx Bocanegra


   Consult Reason/Comments: Sepsis, pneumonia, RLE cellulitis


   Do you want consulting provider notified?: Yes





09/11/23 09:49


Consult Physician Routine 


   Consulting Provider: Jairo Collado


   Consult Reason/Comments: Lowell


   Do you want consulting provider notified?: Yes











Primary care physician: 


Arya Namqvi





Hospital Course: 





* 78-year-old lady with past medical history significant for seizure disorder, 

  chronic kidney disease, hyponatremia, hyperparathyroid, hypothyroidism 

  presented to the emergency department with altered mentation.  Patient had 

  recent hospitalization last month with similar presentation and was noted to 

  have breakthrough seizures was seen by Neurology.  Patient sent in from 

  facility due to concern for possible aspiration.  She is a nursing home 

  resident and a poor historian history was obtained from chart review and 

  medical record


* Review of blood work at the time of presentation showed leukocytosis, WBC 11, 

  hemoglobin 11 platelet count 66.  Sodium 132 potassium 5.9 chloride 21 BUN 94 

  creatinine 2.51.


* A chest x-ray obtained on admission was negative for acute intrathoracic 

  process. 


*  Patient was started on IV antibiotic and IV fluid and admitted on medical 

  floor for further management


* Upon evaluation patient was alert to person and does follow commands however 

  does appear lethargic.  Does not know her baseline.  She does appear fluid 

  overload


* 9/11.  Patient seen and examined.  Patient is alert to self, opened eyes and 

  follows commands.  CODE STATUS changed to DO NOT RESUSCITATE limited after 

  discussion with guardian.  Hospice also consulted


* Patient transition to hospice








PHYSICAL EXAMINATION: 


GENERAL: The patient is alert, not in any acute distress.  Chronically ill-

looking, poor oral hygiene


HEENT: Pupils are round and equally reacting to light. EOMI. No scleral icterus.

No conjunctival pallor. Normocephalic, atraumatic. No pharyngeal erythema. No 

thyromegaly. 


CARDIOVASCULAR: S1 and S2 present. No murmurs, rubs, or gallops. 


PULMONARY: Coarse breath sounds bilaterally, no wheeze


ABDOMEN: Soft, nontender, nondistended, normoactive bowel sounds. No palpable 

organomegaly. 


MUSCULOSKELETAL: No joint swelling or deformity.


EXTREMITIES: 1+ pitting edema lower extremities bilaterally


NEUROLOGICAL: Gross neurological examination did not reveal any focal deficits. 


SKIN: No rashes. 





Assessment and plan


*  aspiration pneumonia


*  acute renal failure with hyperkalemia with chronic kidney disease


*  chronic hyponatremia


* Right lotion with the cellulitis


*  acute on chronic encephalopathy metabolic in origin


*  history of seizure disorder


*  failure to thrive








* Patient transition to DO NOT RESUSCITATE limited and discharged as inpatient 

  hospice








Plan - Discharge Summary


New Discharge Prescriptions: 


No Action


   Primidone 250 mg PO HS


   Potassium Chloride 10 meq PO DAILY@0800


   Montelukast [Singulair] 10 mg PO DAILY@0800


   Cinacalcet [Sensipar] 30 mg PO DAILY@0800


   Furosemide [Lasix] 40 mg PO BID@0900,1600  tab


   bisacodyL [Dulcolax] 10 mg RECTAL DAILY PRN


     PRN Reason: Constipation


   Na Phos,M-B/Na Phos,Di-Ba [Fleet Adult] 133 ml RECTAL DAILY PRN


     PRN Reason: Constipation


   Hydrocortisone Lotion [Hytone 2.5% Lotion] 1 applic TOPICAL BID@0800,2100


   Betamethasone Dipropionate [Diprolene AF 0.05% Cream] 1 applic TOPICAL BID

@0800,2100


   Pantoprazole [Protonix] 40 mg PO DAILY@0600


   metOLazone [Zaroxolyn] 2.5 mg PO DAILY@0600


   Levothyroxine Sodium 125 mcg PO DAILY@0600


   Calamine/Zinc Oxide Lotion [Calamine Lotion] 1 applic TOPICAL TID PRN  each


     PRN Reason: Skin Irritation


   Acetaminophen Tab [Tylenol] 650 mg PO Q4H PRN


     PRN Reason: Fever And/ Or Pain


   levETIRAcetam [Keppra] 1,000 mg PO Q12HR@0800,2100


   Magnesium Hydroxide [Milk of Magnesia Concentrate] 7,200 mg PO Q48H PRN


     PRN Reason: Constipation


Discharge Medication List





Levothyroxine Sodium 125 mcg PO DAILY@0600 08/20/21 [History]


Primidone 250 mg PO HS 08/20/21 [History]


Potassium Chloride 10 meq PO DAILY@0800 08/23/21 [History]


Cinacalcet [Sensipar] 30 mg PO DAILY@0800 08/06/23 [History]


Montelukast [Singulair] 10 mg PO DAILY@0800 08/06/23 [History]


Calamine/Zinc Oxide Lotion [Calamine Lotion] 1 applic TOPICAL TID PRN  each 

08/15/23 [Rx]


Furosemide [Lasix] 40 mg PO BID@0900,1600  tab 08/15/23 [Rx]


Acetaminophen Tab [Tylenol] 650 mg PO Q4H PRN 09/09/23 [History]


Betamethasone Dipropionate [Diprolene AF 0.05% Cream] 1 applic TOPICAL 

BID@0800,2100 09/09/23 [History]


Hydrocortisone Lotion [Hytone 2.5% Lotion] 1 applic TOPICAL BID@0800,2100 09/09/23 [History]


Magnesium Hydroxide [Milk of Magnesia Concentrate] 7,200 mg PO Q48H PRN 09/09/23

[History]


Na Phos,M-B/Na Phos,Di-Ba [Fleet Adult] 133 ml RECTAL DAILY PRN 09/09/23 

[History]


Pantoprazole [Protonix] 40 mg PO DAILY@0600 09/09/23 [History]


bisacodyL [Dulcolax] 10 mg RECTAL DAILY PRN 09/09/23 [History]


levETIRAcetam [Keppra] 1,000 mg PO Q12HR@0800,2100 09/09/23 [History]


metOLazone [Zaroxolyn] 2.5 mg PO DAILY@0600 09/09/23 [History]








Follow up Appointment(s)/Referral(s): 


Arya Levy MD [Primary Care Provider] - 1-2 days


Discharge Disposition: HOME WITH HOSPICE

## 2023-09-13 RX ADMIN — GLYCOPYRROLATE PRN MG: 0.2 INJECTION, SOLUTION INTRAMUSCULAR; INTRAVENOUS at 17:47

## 2023-09-13 RX ADMIN — GLYCOPYRROLATE PRN MG: 0.2 INJECTION, SOLUTION INTRAMUSCULAR; INTRAVENOUS at 23:49

## 2023-09-13 RX ADMIN — ATROPINE SULFATE PRN DROPS: 10 SOLUTION/ DROPS OPHTHALMIC at 21:31

## 2023-09-13 RX ADMIN — LACOSAMIDE SCH MG: 10 INJECTION INTRAVENOUS at 08:34

## 2023-09-13 RX ADMIN — LEVETIRACETAM SCH MG: 100 INJECTION, SOLUTION, CONCENTRATE INTRAVENOUS at 21:32

## 2023-09-13 RX ADMIN — ATROPINE SULFATE PRN DROPS: 10 SOLUTION/ DROPS OPHTHALMIC at 15:34

## 2023-09-13 RX ADMIN — MORPHINE SULFATE SCH: 50 INJECTION, SOLUTION, CONCENTRATE INTRAVENOUS at 16:24

## 2023-09-13 RX ADMIN — CEFAZOLIN SCH MLS/HR: 330 INJECTION, POWDER, FOR SOLUTION INTRAMUSCULAR; INTRAVENOUS at 16:25

## 2023-09-13 RX ADMIN — LEVETIRACETAM SCH MG: 100 INJECTION, SOLUTION, CONCENTRATE INTRAVENOUS at 08:32

## 2023-09-13 RX ADMIN — ATROPINE SULFATE PRN DROPS: 10 SOLUTION/ DROPS OPHTHALMIC at 10:50

## 2023-09-13 RX ADMIN — LACOSAMIDE SCH MG: 10 INJECTION INTRAVENOUS at 21:31

## 2023-09-13 RX ADMIN — GLYCOPYRROLATE PRN MG: 0.2 INJECTION, SOLUTION INTRAMUSCULAR; INTRAVENOUS at 10:59

## 2023-09-13 NOTE — P.PN
Subjective


Progress Note Date: 09/13/23





* 78-year-old lady with past medical history significant for seizure disorder, 

  chronic kidney disease, hyponatremia, hyperparathyroid, hypothyroidism 

  presented to the emergency department with altered mentation.  Patient had 

  recent hospitalization last month with similar presentation and was noted to 

  have breakthrough seizures was seen by Neurology.  Patient sent in from 

  facility due to concern for possible aspiration.  She is a nursing home 

  resident and a poor historian history was obtained from chart review and 

  medical record


* Review of blood work at the time of presentation showed leukocytosis, WBC 11, 

  hemoglobin 11 platelet count 66.  Sodium 132 potassium 5.9 chloride 21 BUN 94 

  creatinine 2.51.


* A chest x-ray obtained on admission was negative for acute intrathoracic 

  process. 


*  Patient was started on IV antibiotic and IV fluid and admitted on medical 

  floor for further management


* Upon evaluation patient was alert to person and does follow commands however 

  does appear lethargic.  Does not know her baseline.  She does appear fluid 

  overload


* 9/11.  Patient seen and examined.  Patient is alert to self, opened eyes and 

  follows commands.  CODE STATUS changed to DO NOT RESUSCITATE limited after 

  discussion with guardian.  Hospice also consulted Patient transition to 

  hospice


* 9/12: Patient to remain hospice with end-of-life care


* 9/13: Patient remains comfort measures/hospice.  GIP plan discussed with 

  nursing staff





Objective





- Vital Signs


Vital signs: 


                                   Vital Signs











Temp  96.9 F L  09/13/23 07:03


 


Pulse  89   09/13/23 07:03


 


Resp  14   09/13/23 07:03


 


BP      


 


Pulse Ox  95   09/13/23 07:03


 


FiO2      








                                 Intake & Output











 09/12/23 09/13/23 09/13/23





 18:59 06:59 18:59


 


Intake Total 18.717  


 


Output Total 200 100 


 


Balance -181.283 -100 


 


Intake:   


 


  Intake, IV Titration 18.717  





  Amount   


 


    Morphine Sulfate (100 mg/ 18.717  





    2 ml) 100 mg In Sodium   





    Chloride 0.9% 100 ml @ 1   





    MG/HR 1.02 mls/hr IV .   





    Q24H [facility] Rx#:[id number]   


 


Output:   


 


  Urine 200 100 


 


Other:   


 


  Voiding Method Indwelling Catheter Indwelling Catheter Indwelling Catheter














- Exam








PHYSICAL EXAMINATION: 


Exam limited patient is hospice, resting comfortably





Assessment and Plan


Assessment: 








Assessment and plan


*  aspiration pneumonia


*  acute renal failure with hyperkalemia with chronic kidney disease


*  chronic hyponatremia


* Right lotion with the cellulitis


*  acute on chronic encephalopathy metabolic in origin


*  history of seizure disorder


*  failure to thrive





* Patient transitioned to comfort measures.  


* Patient is inpatient hospice.  


* Continue IV antiseizure medications.  


* Continue scopolamine patch


* pain medications as needed


* Patient on morphine infusion

## 2023-09-14 RX ADMIN — ATROPINE SULFATE PRN DROPS: 10 SOLUTION/ DROPS OPHTHALMIC at 09:27

## 2023-09-14 RX ADMIN — MORPHINE SULFATE SCH MLS/HR: 50 INJECTION, SOLUTION, CONCENTRATE INTRAVENOUS at 05:56

## 2023-09-14 RX ADMIN — LEVETIRACETAM SCH MG: 100 INJECTION, SOLUTION, CONCENTRATE INTRAVENOUS at 20:52

## 2023-09-14 RX ADMIN — LACOSAMIDE SCH MG: 10 INJECTION INTRAVENOUS at 08:35

## 2023-09-14 RX ADMIN — GLYCOPYRROLATE SCH MG: 0.2 INJECTION, SOLUTION INTRAMUSCULAR; INTRAVENOUS at 16:31

## 2023-09-14 RX ADMIN — GLYCOPYRROLATE SCH MG: 0.2 INJECTION, SOLUTION INTRAMUSCULAR; INTRAVENOUS at 12:23

## 2023-09-14 RX ADMIN — LACOSAMIDE SCH MG: 10 INJECTION INTRAVENOUS at 20:53

## 2023-09-14 RX ADMIN — ATROPINE SULFATE PRN DROPS: 10 SOLUTION/ DROPS OPHTHALMIC at 02:16

## 2023-09-14 RX ADMIN — CEFAZOLIN SCH MLS/HR: 330 INJECTION, POWDER, FOR SOLUTION INTRAMUSCULAR; INTRAVENOUS at 20:53

## 2023-09-14 RX ADMIN — ATROPINE SULFATE PRN DROPS: 10 SOLUTION/ DROPS OPHTHALMIC at 06:06

## 2023-09-14 RX ADMIN — ATROPINE SULFATE PRN DROPS: 10 SOLUTION/ DROPS OPHTHALMIC at 14:46

## 2023-09-14 RX ADMIN — LEVETIRACETAM SCH MG: 100 INJECTION, SOLUTION, CONCENTRATE INTRAVENOUS at 08:35

## 2023-09-14 RX ADMIN — GLYCOPYRROLATE PRN MG: 0.2 INJECTION, SOLUTION INTRAMUSCULAR; INTRAVENOUS at 06:03

## 2023-09-14 RX ADMIN — GLYCOPYRROLATE SCH MG: 0.2 INJECTION, SOLUTION INTRAMUSCULAR; INTRAVENOUS at 20:52

## 2023-09-14 NOTE — P.PN
Subjective


Progress Note Date: 09/14/23





* 78-year-old lady with past medical history significant for seizure disorder, 

  chronic kidney disease, hyponatremia, hyperparathyroid, hypothyroidism 

  presented to the emergency department with altered mentation.  Patient had 

  recent hospitalization last month with similar presentation and was noted to 

  have breakthrough seizures was seen by Neurology.  Patient sent in from 

  facility due to concern for possible aspiration.  She is a nursing home 

  resident and a poor historian history was obtained from chart review and 

  medical record


* Review of blood work at the time of presentation showed leukocytosis, WBC 11, 

  hemoglobin 11 platelet count 66.  Sodium 132 potassium 5.9 chloride 21 BUN 94 

  creatinine 2.51.


* A chest x-ray obtained on admission was negative for acute intrathoracic 

  process. 


*  Patient was started on IV antibiotic and IV fluid and admitted on medical 

  floor for further management


* Upon evaluation patient was alert to person and does follow commands however 

  does appear lethargic.  Does not know her baseline.  She does appear fluid 

  overload


* 9/11.  Patient seen and examined.  Patient is alert to self, opened eyes and 

  follows commands.  CODE STATUS changed to DO NOT RESUSCITATE limited after 

  discussion with guardian.  Hospice also consulted Patient transition to 

  hospice


* 9/12: Patient to remain hospice with end-of-life care


* 9/13: Patient remains comfort measures/hospice.  GIP plan discussed with 

  nursing staff


* 9/14; patient assessed at bedside remains comfortable family at bedside as 

  well continue with inpatient hospice management





Objective





- Vital Signs


Vital signs: 


                                   Vital Signs











Temp  98.3 F   09/14/23 08:30


 


Pulse  69   09/14/23 08:30


 


Resp  10 L  09/14/23 08:30


 


BP  56/33   09/14/23 08:30


 


Pulse Ox  76 L  09/14/23 08:30


 


FiO2      








                                 Intake & Output











 09/13/23 09/14/23 09/14/23





 18:59 06:59 18:59


 


Intake Total  83.283 6.494


 


Output Total 0  


 


Balance 0 83.283 6.494


 


Intake:   


 


  Intake, IV Titration  83.283 6.494





  Amount   


 


    Morphine Sulfate (100 mg/  83.283 6.494





    2 ml) 100 mg In Sodium   





    Chloride 0.9% 100 ml @ 1   





    MG/HR 1.02 mls/hr IV .   





    Q24H [facility] Rx#:[id number]   


 


Output:   


 


  Urine 0  


 


Other:   


 


  Voiding Method Indwelling Catheter Indwelling Catheter Indwelling Catheter














- Exam








PHYSICAL EXAMINATION: 


Exam limited patient is hospice, resting comfortably


Disoriented, no use of accessory muscle





Assessment and Plan


Assessment: 








Assessment and plan





*  aspiration pneumonia


*  acute renal failure with hyperkalemia with chronic kidney disease


*  chronic hyponatremia


* Right lotion with the cellulitis


*  acute on chronic encephalopathy metabolic in origin


*  history of seizure disorder


*  failure to thrive





* Patient transitioned to comfort measures.  


* Patient is inpatient hospice.  


* Continue IV antiseizure medications.  


* Continue scopolamine patch


* pain medications as needed


* Patient on morphine infusion

## 2023-09-15 VITALS — DIASTOLIC BLOOD PRESSURE: 27 MMHG | HEART RATE: 60 BPM | SYSTOLIC BLOOD PRESSURE: 45 MMHG | RESPIRATION RATE: 10 BRPM

## 2023-09-15 VITALS — TEMPERATURE: 98 F

## 2023-09-15 RX ADMIN — LACOSAMIDE SCH MG: 10 INJECTION INTRAVENOUS at 09:00

## 2023-09-15 RX ADMIN — GLYCOPYRROLATE SCH MG: 0.2 INJECTION, SOLUTION INTRAMUSCULAR; INTRAVENOUS at 04:54

## 2023-09-15 RX ADMIN — GLYCOPYRROLATE SCH MG: 0.2 INJECTION, SOLUTION INTRAMUSCULAR; INTRAVENOUS at 01:06

## 2023-09-15 RX ADMIN — LEVETIRACETAM SCH MG: 100 INJECTION, SOLUTION, CONCENTRATE INTRAVENOUS at 09:00

## 2023-09-15 RX ADMIN — ATROPINE SULFATE PRN DROPS: 10 SOLUTION/ DROPS OPHTHALMIC at 04:54

## 2023-09-15 RX ADMIN — MORPHINE SULFATE SCH MLS/HR: 50 INJECTION, SOLUTION, CONCENTRATE INTRAVENOUS at 06:48

## 2023-09-15 RX ADMIN — ATROPINE SULFATE PRN DROPS: 10 SOLUTION/ DROPS OPHTHALMIC at 01:07

## 2023-09-15 RX ADMIN — GLYCOPYRROLATE SCH MG: 0.2 INJECTION, SOLUTION INTRAMUSCULAR; INTRAVENOUS at 09:00

## 2023-09-18 NOTE — CDI
Documentation Clarification Form



Date: 09/18/2023 11:43:12 AM

From: Lenore Perez

Phone: 

MRN: [id number]

Admit Date: 09/11/2023 03:55:00 PM

Patient Name: Ilana Schulte

Visit Number: [id number]

Discharge Date:  09/15/2023 01:56:00 PM



ATTENTION: The Clinical Documentation Specialists (CDI) and [facility] Coding Staff 
appreciate your assistance in clarifying documentation. Please respond to the 
clarification below the line at the bottom and electronically sign. The CDI & 
[facility] Coding staff will review the response and follow-up if needed. Please note: 
Queries are made part of the Legal Health Record. If you have any questions, 
please contact the author of this message via ITS.



Dr. Sil Begum



Unspecified CKD is documented Progress Note 9/13/23.  Additional clarification 
regarding the stage of CKD is requested.



History/Risk Factors: 77yo F, Asp PNA, ОЛЬГА, hyperkalemia, CKD, chronic 
hyponatremia, groincellulitis, A/Cencephalopathy metabolic, Hx seizure 
disorder, FTT



Clinical Indicators: 

BUN 94

Creatinine 2.51

GFR unavailable



Treatment: monitoring

      

Please clarify the stage of the CKD, if known:

[  ] CKD Stage 1 (GFR > 90)

[  ] CKD Stage 2 (GFR 60-89)

[  ] CKD Stage 3 (GFR 30-59)

[  ] CKD Stage 3a (GFR 45-59)

[  ] CKD Stage 3b (GFR 30-44)

[  ] CKD Stage 4 (GFR 15-29)

[  ] CKD Stage 5 (GFR <15)

[  ] Other, please specify ___________

[  ] Unable to determine



(Template Last revised: February 2021)

___________________________________________________________________________

MTDD